# Patient Record
Sex: MALE | Race: WHITE | Employment: OTHER | URBAN - METROPOLITAN AREA
[De-identification: names, ages, dates, MRNs, and addresses within clinical notes are randomized per-mention and may not be internally consistent; named-entity substitution may affect disease eponyms.]

---

## 2017-10-30 ENCOUNTER — RECORDS - HEALTHEAST (OUTPATIENT)
Dept: LAB | Facility: CLINIC | Age: 62
End: 2017-10-30

## 2017-10-30 LAB
CHOLEST SERPL-MCNC: 215 MG/DL
FASTING STATUS PATIENT QL REPORTED: ABNORMAL
HDLC SERPL-MCNC: 54 MG/DL
LDLC SERPL CALC-MCNC: 135 MG/DL
PSA SERPL-MCNC: 1.4 NG/ML (ref 0–4.5)
TRIGL SERPL-MCNC: 128 MG/DL

## 2017-10-31 LAB
ANA SER QL: 8.1 U
HCV AB SERPL QL IA: NEGATIVE

## 2017-11-07 LAB — DNA (DS) ANTIBODY - HISTORICAL: 3 IU

## 2018-02-01 ENCOUNTER — RECORDS - HEALTHEAST (OUTPATIENT)
Dept: GENERAL RADIOLOGY | Facility: CLINIC | Age: 63
End: 2018-02-01

## 2018-02-01 ENCOUNTER — OFFICE VISIT - HEALTHEAST (OUTPATIENT)
Dept: RHEUMATOLOGY | Facility: CLINIC | Age: 63
End: 2018-02-01

## 2018-02-01 DIAGNOSIS — M25.50 MULTIPLE JOINT PAIN: ICD-10-CM

## 2018-02-01 DIAGNOSIS — G89.29 CHRONIC BILATERAL LOW BACK PAIN WITHOUT SCIATICA: ICD-10-CM

## 2018-02-01 DIAGNOSIS — G89.29 OTHER CHRONIC PAIN: ICD-10-CM

## 2018-02-01 DIAGNOSIS — R29.898 WEAKNESS OF BOTH LEGS: ICD-10-CM

## 2018-02-01 DIAGNOSIS — M79.7 FIBROMYALGIA: ICD-10-CM

## 2018-02-01 DIAGNOSIS — R29.898 WEAKNESS OF BOTH ARMS: ICD-10-CM

## 2018-02-01 DIAGNOSIS — R20.2 PARESTHESIA OF BILATERAL LEGS: ICD-10-CM

## 2018-02-01 DIAGNOSIS — R76.8 ANA POSITIVE: ICD-10-CM

## 2018-02-01 DIAGNOSIS — M54.2 CHRONIC NECK PAIN: ICD-10-CM

## 2018-02-01 DIAGNOSIS — M54.50 LOW BACK PAIN: ICD-10-CM

## 2018-02-01 DIAGNOSIS — G89.29 CHRONIC NECK PAIN: ICD-10-CM

## 2018-02-01 DIAGNOSIS — M54.2 CERVICALGIA: ICD-10-CM

## 2018-02-01 DIAGNOSIS — M54.50 CHRONIC BILATERAL LOW BACK PAIN WITHOUT SCIATICA: ICD-10-CM

## 2018-02-01 DIAGNOSIS — M17.0 OSTEOARTHRITIS OF BOTH KNEES, UNSPECIFIED OSTEOARTHRITIS TYPE: ICD-10-CM

## 2018-02-01 LAB
ALBUMIN UR-MCNC: NEGATIVE MG/DL
APPEARANCE UR: CLEAR
BILIRUB UR QL STRIP: NEGATIVE
COLOR UR AUTO: YELLOW
CREAT UR-MCNC: 148.1 MG/DL
GLUCOSE UR STRIP-MCNC: NEGATIVE MG/DL
HGB UR QL STRIP: NEGATIVE
KETONES UR STRIP-MCNC: NEGATIVE MG/DL
LEUKOCYTE ESTERASE UR QL STRIP: NEGATIVE
MICROALBUMIN UR-MCNC: <0.5 MG/DL (ref 0–1.99)
MICROALBUMIN/CREAT UR: NORMAL MG/G
NITRATE UR QL: NEGATIVE
PH UR STRIP: 5.5 [PH] (ref 4.5–8)
SP GR UR STRIP: 1.02 (ref 1–1.03)
UROBILINOGEN UR STRIP-ACNC: NORMAL

## 2018-02-01 ASSESSMENT — MIFFLIN-ST. JEOR: SCORE: 1751.75

## 2018-02-02 LAB
ALBUMIN SERPL-MCNC: 4.2 G/DL (ref 3.5–5)
C REACTIVE PROTEIN LHE: 0.5 MG/DL (ref 0–0.8)
CALCIUM SERPL-MCNC: 9.6 MG/DL (ref 8.5–10.5)
MAGNESIUM SERPL-MCNC: 2.4 MG/DL (ref 1.8–2.6)
URATE SERPL-MCNC: 6.7 MG/DL (ref 3–8)

## 2018-02-04 LAB — ACE SERPL-CCNC: 22 U/L (ref 9–67)

## 2018-02-05 LAB
25(OH)D3 SERPL-MCNC: 22.3 NG/ML (ref 30–80)
CARDIOLIPIN IGA SER IA-ACNC: <0.5 APL U/ML (ref 0–19.9)
CARDIOLIPIN IGG SER IA-ACNC: <1.6 GPL-U/ML (ref 0–19.9)
CARDIOLIPIN IGM SER IA-ACNC: 0.8 MPL-U/ML (ref 0–19.9)
HLA-B27 RESULT - HISTORICAL: NEGATIVE
INTERPRETATION: NORMAL

## 2018-02-07 ENCOUNTER — COMMUNICATION - HEALTHEAST (OUTPATIENT)
Dept: RHEUMATOLOGY | Facility: CLINIC | Age: 63
End: 2018-02-07

## 2018-02-07 ENCOUNTER — AMBULATORY - HEALTHEAST (OUTPATIENT)
Dept: PHYSICAL MEDICINE AND REHAB | Facility: CLINIC | Age: 63
End: 2018-02-07

## 2018-02-07 DIAGNOSIS — R20.2 PARESTHESIA OF BILATERAL LEGS: ICD-10-CM

## 2018-02-07 DIAGNOSIS — E55.9 VITAMIN D DEFICIENCY: ICD-10-CM

## 2018-02-07 DIAGNOSIS — G89.29 CHRONIC BILATERAL LOW BACK PAIN, WITH SCIATICA PRESENCE UNSPECIFIED: ICD-10-CM

## 2018-02-07 DIAGNOSIS — M54.50 LUMBAR BACK PAIN: ICD-10-CM

## 2018-02-07 DIAGNOSIS — M54.5 CHRONIC BILATERAL LOW BACK PAIN, WITH SCIATICA PRESENCE UNSPECIFIED: ICD-10-CM

## 2018-02-07 LAB — DRVVT, LUPUS ANTICOAGULANT - HISTORICAL: 42 SEC

## 2018-02-12 ENCOUNTER — HOSPITAL ENCOUNTER (OUTPATIENT)
Dept: PHYSICAL MEDICINE AND REHAB | Facility: CLINIC | Age: 63
Discharge: HOME | End: 2018-02-12
Attending: PHYSICAL MEDICINE & REHABILITATION

## 2018-02-12 ENCOUNTER — COMMUNICATION - HEALTHEAST (OUTPATIENT)
Dept: RHEUMATOLOGY | Facility: CLINIC | Age: 63
End: 2018-02-12

## 2018-02-12 DIAGNOSIS — M43.16 SPONDYLOLISTHESIS, LUMBAR REGION: ICD-10-CM

## 2018-02-12 DIAGNOSIS — M25.50 ARTHRALGIA: ICD-10-CM

## 2018-02-12 DIAGNOSIS — M54.2 CERVICALGIA: ICD-10-CM

## 2018-02-12 DIAGNOSIS — M79.10 MYALGIA: ICD-10-CM

## 2018-02-12 DIAGNOSIS — R41.3 MEMORY CHANGE: ICD-10-CM

## 2018-02-12 ASSESSMENT — MIFFLIN-ST. JEOR: SCORE: 1751.75

## 2018-02-16 ENCOUNTER — HOSPITAL ENCOUNTER (OUTPATIENT)
Dept: PHYSICAL MEDICINE AND REHAB | Facility: CLINIC | Age: 63
Discharge: HOME | End: 2018-02-16
Attending: INTERNAL MEDICINE

## 2018-02-16 DIAGNOSIS — G89.29 CHRONIC BILATERAL LOW BACK PAIN WITHOUT SCIATICA: ICD-10-CM

## 2018-02-16 DIAGNOSIS — R29.898 WEAKNESS OF BOTH ARMS: ICD-10-CM

## 2018-02-16 DIAGNOSIS — G89.29 CHRONIC NECK PAIN: ICD-10-CM

## 2018-02-16 DIAGNOSIS — M54.2 CHRONIC NECK PAIN: ICD-10-CM

## 2018-02-16 DIAGNOSIS — R29.898 WEAKNESS OF BOTH LEGS: ICD-10-CM

## 2018-02-16 DIAGNOSIS — M54.50 CHRONIC BILATERAL LOW BACK PAIN WITHOUT SCIATICA: ICD-10-CM

## 2018-02-19 ENCOUNTER — COMMUNICATION - HEALTHEAST (OUTPATIENT)
Dept: RHEUMATOLOGY | Facility: CLINIC | Age: 63
End: 2018-02-19

## 2018-02-20 ENCOUNTER — COMMUNICATION - HEALTHEAST (OUTPATIENT)
Dept: RHEUMATOLOGY | Facility: CLINIC | Age: 63
End: 2018-02-20

## 2018-03-12 ENCOUNTER — HOSPITAL ENCOUNTER (OUTPATIENT)
Dept: MRI IMAGING | Facility: CLINIC | Age: 63
Discharge: HOME | End: 2018-03-12
Attending: PHYSICAL MEDICINE & REHABILITATION

## 2018-03-12 DIAGNOSIS — R41.3 MEMORY CHANGE: ICD-10-CM

## 2018-03-12 DIAGNOSIS — M79.10 MYALGIA: ICD-10-CM

## 2018-03-12 DIAGNOSIS — M54.2 CERVICALGIA: ICD-10-CM

## 2018-03-13 ENCOUNTER — COMMUNICATION - HEALTHEAST (OUTPATIENT)
Dept: PHYSICAL MEDICINE AND REHAB | Facility: CLINIC | Age: 63
End: 2018-03-13

## 2018-03-14 ENCOUNTER — OFFICE VISIT - HEALTHEAST (OUTPATIENT)
Dept: RHEUMATOLOGY | Facility: CLINIC | Age: 63
End: 2018-03-14

## 2018-03-14 DIAGNOSIS — E55.9 VITAMIN D DEFICIENCY: ICD-10-CM

## 2018-03-14 DIAGNOSIS — R76.8 ANA POSITIVE: ICD-10-CM

## 2018-03-14 DIAGNOSIS — M54.50 CHRONIC BILATERAL LOW BACK PAIN WITHOUT SCIATICA: ICD-10-CM

## 2018-03-14 DIAGNOSIS — M17.0 OSTEOARTHRITIS OF BOTH KNEES, UNSPECIFIED OSTEOARTHRITIS TYPE: ICD-10-CM

## 2018-03-14 DIAGNOSIS — R20.2 PARESTHESIA OF BILATERAL LEGS: ICD-10-CM

## 2018-03-14 DIAGNOSIS — M79.671 PAIN IN BOTH FEET: ICD-10-CM

## 2018-03-14 DIAGNOSIS — G89.29 CHRONIC BILATERAL LOW BACK PAIN WITHOUT SCIATICA: ICD-10-CM

## 2018-03-14 DIAGNOSIS — M79.7 FIBROMYALGIA: ICD-10-CM

## 2018-03-14 DIAGNOSIS — M21.70 LEG LENGTH DISCREPANCY: ICD-10-CM

## 2018-03-14 DIAGNOSIS — M79.672 PAIN IN BOTH FEET: ICD-10-CM

## 2018-03-14 ASSESSMENT — MIFFLIN-ST. JEOR: SCORE: 1754.47

## 2018-03-15 ENCOUNTER — HOSPITAL ENCOUNTER (OUTPATIENT)
Dept: PHYSICAL MEDICINE AND REHAB | Facility: CLINIC | Age: 63
Discharge: HOME | End: 2018-03-15
Attending: PHYSICAL MEDICINE & REHABILITATION

## 2018-03-15 DIAGNOSIS — G89.29 CHRONIC NECK PAIN: ICD-10-CM

## 2018-03-15 DIAGNOSIS — M54.2 CHRONIC NECK PAIN: ICD-10-CM

## 2018-03-15 DIAGNOSIS — M25.50 ARTHRALGIA: ICD-10-CM

## 2018-03-15 DIAGNOSIS — M79.10 MYALGIA: ICD-10-CM

## 2018-03-15 DIAGNOSIS — R41.3 MEMORY CHANGE: ICD-10-CM

## 2018-03-15 DIAGNOSIS — M43.16 SPONDYLOLISTHESIS, LUMBAR REGION: ICD-10-CM

## 2018-03-15 ASSESSMENT — MIFFLIN-ST. JEOR: SCORE: 1742.67

## 2018-04-11 ENCOUNTER — COMMUNICATION - HEALTHEAST (OUTPATIENT)
Dept: ADMINISTRATIVE | Facility: CLINIC | Age: 63
End: 2018-04-11

## 2018-05-04 ENCOUNTER — COMMUNICATION - HEALTHEAST (OUTPATIENT)
Dept: SCHEDULING | Facility: CLINIC | Age: 63
End: 2018-05-04

## 2018-05-04 DIAGNOSIS — E55.9 VITAMIN D DEFICIENCY: ICD-10-CM

## 2018-07-18 ENCOUNTER — OFFICE VISIT - HEALTHEAST (OUTPATIENT)
Dept: RHEUMATOLOGY | Facility: CLINIC | Age: 63
End: 2018-07-18

## 2018-07-18 DIAGNOSIS — M79.671 PAIN IN BOTH FEET: ICD-10-CM

## 2018-07-18 DIAGNOSIS — R76.8 ANA POSITIVE: ICD-10-CM

## 2018-07-18 DIAGNOSIS — E55.9 VITAMIN D DEFICIENCY: ICD-10-CM

## 2018-07-18 DIAGNOSIS — M54.5 CHRONIC BILATERAL LOW BACK PAIN, WITH SCIATICA PRESENCE UNSPECIFIED: ICD-10-CM

## 2018-07-18 DIAGNOSIS — G89.29 CHRONIC BILATERAL LOW BACK PAIN, WITH SCIATICA PRESENCE UNSPECIFIED: ICD-10-CM

## 2018-07-18 DIAGNOSIS — M79.7 FIBROMYALGIA: ICD-10-CM

## 2018-07-18 DIAGNOSIS — M25.50 MULTIPLE JOINT PAIN: ICD-10-CM

## 2018-07-18 DIAGNOSIS — M17.0 OSTEOARTHRITIS OF BOTH KNEES, UNSPECIFIED OSTEOARTHRITIS TYPE: ICD-10-CM

## 2018-07-18 DIAGNOSIS — M79.672 PAIN IN BOTH FEET: ICD-10-CM

## 2018-07-18 ASSESSMENT — MIFFLIN-ST. JEOR: SCORE: 1742.67

## 2018-08-30 ENCOUNTER — COMMUNICATION - HEALTHEAST (OUTPATIENT)
Dept: LAB | Facility: CLINIC | Age: 63
End: 2018-08-30

## 2018-08-30 DIAGNOSIS — M15.9 OSTEOARTHRITIS OF MULTIPLE JOINTS, UNSPECIFIED OSTEOARTHRITIS TYPE: ICD-10-CM

## 2018-08-30 DIAGNOSIS — E55.9 VITAMIN D DEFICIENCY: ICD-10-CM

## 2018-09-05 ENCOUNTER — AMBULATORY - HEALTHEAST (OUTPATIENT)
Dept: LAB | Facility: CLINIC | Age: 63
End: 2018-09-05

## 2018-09-05 DIAGNOSIS — E55.9 VITAMIN D DEFICIENCY: ICD-10-CM

## 2018-09-05 DIAGNOSIS — M15.9 OSTEOARTHRITIS OF MULTIPLE JOINTS, UNSPECIFIED OSTEOARTHRITIS TYPE: ICD-10-CM

## 2018-09-05 LAB
ALBUMIN SERPL-MCNC: 4.3 G/DL (ref 3.5–5)
ALT SERPL W P-5'-P-CCNC: 34 U/L (ref 0–45)
AST SERPL W P-5'-P-CCNC: 25 U/L (ref 0–40)
CALCIUM SERPL-MCNC: 9.8 MG/DL (ref 8.5–10.5)
CREAT SERPL-MCNC: 0.98 MG/DL (ref 0.7–1.3)
GFR SERPL CREATININE-BSD FRML MDRD: >60 ML/MIN/1.73M2

## 2018-09-06 LAB — 25(OH)D3 SERPL-MCNC: 28 NG/ML (ref 30–80)

## 2018-09-17 ENCOUNTER — COMMUNICATION - HEALTHEAST (OUTPATIENT)
Dept: RHEUMATOLOGY | Facility: CLINIC | Age: 63
End: 2018-09-17

## 2018-09-17 DIAGNOSIS — E55.9 VITAMIN D DEFICIENCY: ICD-10-CM

## 2018-09-25 ENCOUNTER — COMMUNICATION - HEALTHEAST (OUTPATIENT)
Dept: RHEUMATOLOGY | Facility: CLINIC | Age: 63
End: 2018-09-25

## 2018-10-24 ENCOUNTER — OFFICE VISIT - HEALTHEAST (OUTPATIENT)
Dept: RHEUMATOLOGY | Facility: CLINIC | Age: 63
End: 2018-10-24

## 2018-10-24 DIAGNOSIS — E55.9 VITAMIN D DEFICIENCY: ICD-10-CM

## 2018-10-24 DIAGNOSIS — M17.0 OSTEOARTHRITIS OF BOTH KNEES, UNSPECIFIED OSTEOARTHRITIS TYPE: ICD-10-CM

## 2018-10-24 DIAGNOSIS — M25.50 MULTIPLE JOINT PAIN: ICD-10-CM

## 2018-10-24 DIAGNOSIS — R76.8 ANA POSITIVE: ICD-10-CM

## 2018-10-24 DIAGNOSIS — M79.7 FIBROMYALGIA: ICD-10-CM

## 2018-11-16 ENCOUNTER — COMMUNICATION - HEALTHEAST (OUTPATIENT)
Dept: ADMINISTRATIVE | Facility: CLINIC | Age: 63
End: 2018-11-16

## 2018-12-03 ENCOUNTER — COMMUNICATION - HEALTHEAST (OUTPATIENT)
Dept: RHEUMATOLOGY | Facility: CLINIC | Age: 63
End: 2018-12-03

## 2018-12-10 ENCOUNTER — COMMUNICATION - HEALTHEAST (OUTPATIENT)
Dept: RHEUMATOLOGY | Facility: CLINIC | Age: 63
End: 2018-12-10

## 2019-04-03 ENCOUNTER — COMMUNICATION - HEALTHEAST (OUTPATIENT)
Dept: RHEUMATOLOGY | Facility: CLINIC | Age: 64
End: 2019-04-03

## 2019-04-23 ENCOUNTER — COMMUNICATION - HEALTHEAST (OUTPATIENT)
Dept: ADMINISTRATIVE | Facility: CLINIC | Age: 64
End: 2019-04-23

## 2019-05-09 ENCOUNTER — COMMUNICATION - HEALTHEAST (OUTPATIENT)
Dept: RHEUMATOLOGY | Facility: CLINIC | Age: 64
End: 2019-05-09

## 2019-05-09 DIAGNOSIS — M79.7 FIBROMYALGIA: ICD-10-CM

## 2020-09-17 ENCOUNTER — VIRTUAL VISIT (OUTPATIENT)
Dept: SLEEP MEDICINE | Facility: CLINIC | Age: 65
End: 2020-09-17
Payer: COMMERCIAL

## 2020-09-17 VITALS — HEIGHT: 68 IN | WEIGHT: 220 LBS | BODY MASS INDEX: 33.34 KG/M2

## 2020-09-17 DIAGNOSIS — R06.83 SNORING: Primary | ICD-10-CM

## 2020-09-17 DIAGNOSIS — G47.30 OBSERVED SLEEP APNEA: ICD-10-CM

## 2020-09-17 DIAGNOSIS — R40.0 SLEEPINESS: ICD-10-CM

## 2020-09-17 PROBLEM — L30.9 CHRONIC ECZEMA: Status: ACTIVE | Noted: 2018-12-05

## 2020-09-17 PROBLEM — M79.7 FIBROMYALGIA: Status: ACTIVE | Noted: 2018-12-05

## 2020-09-17 PROCEDURE — 99203 OFFICE O/P NEW LOW 30 MIN: CPT | Mod: TEL | Performed by: PHYSICIAN ASSISTANT

## 2020-09-17 RX ORDER — ESCITALOPRAM OXALATE 20 MG/1
TABLET ORAL
COMMUNITY
Start: 2020-09-01

## 2020-09-17 ASSESSMENT — MIFFLIN-ST. JEOR: SCORE: 1762.41

## 2020-09-17 NOTE — PROGRESS NOTES
"Quentin Bahena is a 65 year old male who is being evaluated via a billable telephone visit.      The patient has been notified of following:     \"This telephone visit will be conducted via a call between you and your physician/provider. We have found that certain health care needs can be provided without the need for a physical exam.  This service lets us provide the care you need with a short phone conversation.  If a prescription is necessary we can send it directly to your pharmacy.  If lab work is needed we can place an order for that and you can then stop by our lab to have the test done at a later time.    Telephone visits are billed at different rates depending on your insurance coverage. During this emergency period, for some insurers they may be billed the same as an in-person visit.  Please reach out to your insurance provider with any questions.    If during the course of the call the physician/provider feels a telephone visit is not appropriate, you will not be charged for this service.\"    Patient has given verbal consent for Telephone visit?  Yes    What phone number would you like to be contacted at? [phone]    How would you like to obtain your AVS? Mail a copy        "

## 2020-09-17 NOTE — PROGRESS NOTES
Sleep Consultation:    Date on this visit: 9/17/2020    Quentin Bahena is sent by No ref. provider found for a sleep consultation regarding suspected apnea.    Primary Physician: No Ref-Primary, Physician     Quentin Bahena reports sleepiness, snoring for a couple of years. His medical history is significant for reflux, depression/anxiety and fibromyalgia.     Quentin goes to sleep at 11:00 PM during the week. He wakes up at 8:00 AM without an alarm. He sometimes sleeps as late as 10 AM (in and out of sleep at the end of the night). He used to wake spontaneously around 6:30-7 AM when working. He falls asleep in 30 minutes.  Quentin has difficulty falling asleep. He takes an over the counter sleeping pill. He wakes up 4 times a night for 5-10 minutes before falling back to sleep.  Quentin wakes up to go to the bathroom, uncertain reasons and to get a drink of water.  On weekends, his sleep schedule is the same.  Patient gets an average of 8 hours of sleep per night. He does like to lay in bed in bed to help relieve his aches and pains. He had been on cyclobenzaprine until recently. He was concerned it was making him tired.    Patient does not use electronics in bed, watch TV in bed and read in bed. He does sometimes have a racing mind at night (finances).    Quentin is retired as of the beginning of the year (in part due to COVID). He was a . He had only been working part time and was on SSDI for a couple of years due to pain. He lives with his wife.    Quentin does snore every night and snoring is loud. Patient does have a regular bed partner. They frequently sleep separately due to his snoring. There is report of gasping and snorting.  He does have witnessed apneas. He had wisdom teeth pulled in March and was observed to have 4 apnea events. His wife sometimes notices pauses in breathing for the last couple of years as well. He sometimes wakes himself with a snort if he dozes in his chair.  Patient sleeps on his  side. He can't sleep on his back. It is uncomfortable. He has occasional morning dry mouth, denies morning headaches, morning confusion and restless legs. Quentin denies any bruxism, sleep walking, sleep talking, dream enactment, sleep paralysis, cataplexy and hypnogogic/hypnopompic hallucinations.    Quentin has difficulty breathing through his nose, heartburn (treated with omeprazole), depression and anxiety, denies claustrophobia and reflux at night.  He takes omeprazole in the morning for reflux. He recently had a septoplasty, but that did not improve his nasal airflow or sleep.    Quentin denies any significant weight change in the last few years. His weight is up almost 15 pounds in the last 5 years. Patient describes themself as a morning person (used to be more of a morning person).  He would prefer to go to sleep at 11:00 PM and wake up at 8:00 AM.  Patient's [place] Sleepiness score 7/24 inconsistent with daytime sleepiness.      Quentin naps 2 times per week for 120 minutes, feels somewhat refreshed after naps. He takes some inadvertant naps (watching TV).  He denies dozing while driving. Patient was counseled on the importance of driving while alert, to pull over if drowsy, or nap before getting into the vehicle if sleepy.  He uses 2 cups/day of coffee. Last caffeine intake is usually before noon.    Allergies:    No Known Allergies    Medications:    Current Outpatient Medications   Medication Sig Dispense Refill     Cholecalciferol (VITAMIN D3) 25 MCG (1000 UT) CAPS Take 1,000 Units by mouth       escitalopram (LEXAPRO) 20 MG tablet TAKE 1 TABLET BY MOUTH EVERY DAY IN THE MORNING       omeprazole (PRILOSEC) 20 MG DR capsule Take 20 mg by mouth daily         Problem List:  Patient Active Problem List    Diagnosis Date Noted     Chronic eczema 12/05/2018     Priority: Medium     Fibromyalgia 12/05/2018     Priority: Medium     Knee osteoarthritis 04/16/2015     Priority: Medium     MITRA positive 03/26/2015      Priority: Medium     Psoriasis 2009     Priority: Medium        Past Medical/Surgical History:  Past Medical History:   Diagnosis Date     Depressive disorder      Fibromyalgia      Past Surgical History:   Procedure Laterality Date     ENT SURGERY  2020    septoplasty     RELEASE TRIGGER FINGER       wisdom teeth         Social History:  Social History     Socioeconomic History     Marital status:      Spouse name: Not on file     Number of children: Not on file     Years of education: Not on file     Highest education level: Not on file   Occupational History     Not on file   Social Needs     Financial resource strain: Not on file     Food insecurity     Worry: Not on file     Inability: Not on file     Transportation needs     Medical: Not on file     Non-medical: Not on file   Tobacco Use     Smoking status: Former Smoker     Packs/day: 1.00     Years: 15.00     Pack years: 15.00     Last attempt to quit: 2000     Years since quittin.7     Smokeless tobacco: Never Used     Tobacco comment: smoked off and on   Substance and Sexual Activity     Alcohol use: Yes     Comment: 3-4 days per week, 2 drinks, unless golfing, then 6     Drug use: Not Currently     Sexual activity: Not on file   Lifestyle     Physical activity     Days per week: Not on file     Minutes per session: Not on file     Stress: Not on file   Relationships     Social connections     Talks on phone: Not on file     Gets together: Not on file     Attends Caodaism service: Not on file     Active member of club or organization: Not on file     Attends meetings of clubs or organizations: Not on file     Relationship status: Not on file     Intimate partner violence     Fear of current or ex partner: Not on file     Emotionally abused: Not on file     Physically abused: Not on file     Forced sexual activity: Not on file   Other Topics Concern     Parent/sibling w/ CABG, MI or angioplasty before 65F 55M? Not Asked   Social  "History Narrative     Not on file       Family History:  Family History   Problem Relation Age of Onset     Throat cancer Father      Sleep Apnea Son      Diabetes No family hx of        Review of Systems:  A complete review of systems reviewed by me is negative with the exeption of what has been mentioned in the history of present illness.  CONSTITUTIONAL: NEGATIVE for weight gain/loss, fever, chills, sweats or night sweats, drug allergies.  EYES: NEGATIVE for changes in vision, blind spots, double vision.  ENT: NEGATIVE for ear pain, sore throat, sinus pain, post-nasal drip, runny nose, bloody nose  CARDIAC: NEGATIVE for fast heartbeats or fluttering in chest, chest pain or pressure, breathlessness when lying flat, swollen legs or swollen feet.  NEUROLOGIC: NEGATIVE headaches, weakness or numbness in the arms or legs.  DERMATOLOGIC: NEGATIVE for rashes, new moles or change in mole(s)  PULMONARY: NEGATIVE SOB at rest, SOB with activity, dry cough, productive cough, coughing up blood, wheezing or whistling when breathing.    GASTROINTESTINAL: NEGATIVE for nausea or vomitting, loose or watery stools, fat or grease in stools, constipation, abdominal pain, bowel movements black in color or blood noted.  GENITOURINARY: NEGATIVE for pain during urination, blood in urine, urinating more frequently than usual, irregular menstrual periods.  MUSCULOSKELETAL: NEGATIVE for muscle pain, bone pain, swollen joints.  MUSCULOSKELETAL:  POSITIVE for  Joint pains  ENDOCRINE: NEGATIVE for increased thirst or urination, diabetes.  LYMPHATIC: NEGATIVE for swollen lymph nodes, lumps or bumps in the breasts or nipple discharge.    Physical Examination:  Vitals: Ht 1.727 m (5' 8\")   Wt 99.8 kg (220 lb)   BMI 33.45 kg/m           [place] Total Score 9/17/2020   Total score - [place] 7       GOPI Total Score: 22 (09/17/20 0800)      Impression/Plan:    (R06.83) Snoring  (primary encounter diagnosis), (R40.0) Sleepiness, (G47.30) Observed " sleep apnea  Comment: Quentin presents with concerns of loud snoring, daytime sleepiness and observed apnea over the last couple of years. He has been sleeping in a separate room from his wife because of his snoring. She has observed pauses in breathing. He was also observed to have pauses in breathing when having his wisdom teeth pulled this last year. He has discontinued a number of his medications for pain thinking they may have been contributing to daytime sleepiness. His ESS is normal at 7/24, but he will nap 2 hours a couple times per week and doze inadvertently watching TV even after sleeping 8+ hours at night. STOP BANG TOTAL: 5 snoring, tired, observed apnea, age >50 (65), male gender. His son has FIDEL. His BMI is just subthreshold at 33. We do not have a neck circumference available. He does not have HTN. If he does not have apnea, depression and sleep disruption from pain and excessive sleep opportunity may be contributing to low energy.  Plan: HST-Home Sleep Apnea Test            Literature provided regarding sleep apnea.      He will follow up with me in approximately two weeks after his sleep study has been competed to review the results and discuss plan of care.       Polysomnography reviewed.  Obstructive sleep apnea reviewed.  Complications of untreated sleep apnea were reviewed.  39 minutes (8:30 AM to 9:09 AM) was spent during this visit, over 50% in counseling and coordination of care.   Bennett Goltz, PA-C     CC: No ref. provider found

## 2020-09-17 NOTE — PATIENT INSTRUCTIONS
"MY TREATMENT INFORMATION FOR SLEEP APNEA-  Quentin Bahena    DOCTOR : Bennett Ezra Goltz, PA-C  SLEEP CENTER : [place]     MY CONTACT NUMBER: [phone]    Am I having a sleep study at a sleep center?  Make sure you have an appointment for the study before you leave!    Am I having a home sleep study?  Watch this video:  /drop off device-   [email or web address]/[email or web address]?v=[email or web address]  Disposable device sent out require phone/computer application-   [email or web address]/watch?v=BCce_vbiwxE  Please verify your insurance coverage with your insurance carrier    Frequently asked questions:  1. What is Obstructive Sleep Apnea (FIDEL)? FIDEL is the most common type of sleep apnea. Apnea means, \"without breath.\"  Apnea is most often caused by narrowing or collapse of the upper airway as muscles relax during sleep.   Almost everyone has occasional apneas. Most people with sleep apnea have had brief interruptions at night frequently for many years.  The severity of sleep apnea is related to how frequent and severe the events are.   2. What are the consequences of FIDEL? Symptoms include: feeling sleepy during the day, snoring loudly, gasping or stopping of breathing, trouble sleeping, and occasionally morning headaches or heartburn at night.  Sleepiness can be serious and even increase the risk of falling asleep while driving. Other health consequences may include development of high blood pressure and other cardiovascular disease in persons who are susceptible. Untreated FIDEL  can contribute to heart disease, stroke and diabetes.   3. What are the treatment options? In most situations, sleep apnea is a lifelong disease that must be managed with daily therapy. Medications are not effective for sleep apnea and surgery is generally not considered until other therapies have been tried. Your treatment is your choice. Continuous Positive Airway (CPAP) works right away and is the therapy that is effective in nearly everyone. " An oral device to hold your jaw forward is usually the next most reliable option. Other options include postioning devices (to keep you off your back), weight loss, and surgery including a tongue pacing device. There is more detail about some of these options below.    Important tips for using CPAP and similar devices   Know your equipment:  CPAP is continuous positive airway pressure that prevents obstructive sleep apnea by keeping the throat from collapsing while you are sleeping. In most cases, the device is  smart  and can slowly self-adjusts if your throat collapses and keeps a record every day of how well you are treated-this information is available to you and your care team.  BPAP is bilevel positive airway pressure that keeps your throat open and also assists each breath with a pressure boost to maintain adequate breathing.  Special kinds of BPAP are used in patients who have inadequate breathing from lung or heart disease. In most cases, the device is  smart  and can slowly self-adjusts to assist breathing. Like CPAP, the device keeps a record of how well you are treated.  Your mask is your connection to the device. You get to choose what feels most comfortable and the staff will help to make sure if fits. Here: are some examples of the different masks that are available:       Key points to remember on your journey with sleep apnea:  1. Sleep study.  PAP devices often need to be adjusted during a sleep study to show that they are effective and adjusted right.  2. Good tips to remember: Try wearing just the mask during a quiet time during the day so your body adapts to wearing it. A humidifier is recommended for comfort in most cases to prevent drying of your nose and throat. Allergy medication from your provider may help you if you are having nasal congestion.  3. Getting settled-in. It takes more than one night for most of us to get used to wearing a mask. Try wearing just the mask during a quiet time  during the day so your body adapts to wearing it. A humidifier is recommended for comfort in most cases. Our team will work with you carefully on the first day and will be in contact within 4 days and again at 2 and 4 weeks for advice and remote device adjustments. Your therapy is evaluated by the device each day.   4. Use it every night. The more you are able to sleep naturally for 7-8 hours, the more likely you will have good sleep and to prevent health risks or symptoms from sleep apnea. Even if you use it 4 hours it helps. Occasionally all of us are unable to use a medical therapy, in sleep apnea, it is not dangerous to miss one night.   5. Communicate. Call our skilled team on the number provided on the first day if your visit for problems that make it difficult to wear the device. Over 2 out of 3 patients can learn to wear the device long-term with help from our team. Remember to call our team or your sleep providers if you are unable to wear the device as we may have other solutions for those who cannot adapt to mask CPAP therapy. It is recommended that you sleep your sleep provider within the first 3 months and yearly after that if you are not having problems.   6. Use it for your health. We encourage use of CPAP masks during daytime quiet periods to allow your face and brain to adapt to the sensation of CPAP so that it will be a more natural sensation to awaken to at night or during naps. This can be very useful during the first few weeks or months of adapting to CPAP though it does not help medically to wear CPAP during wakefulness and  should not be used as a strategy just to meet guidelines.  7. Take care of your equipment. Make sure you clean your mask and tubing using directions every day and that your filter and mask are replaced as recommended or if they are not working.     BESIDES CPAP, WHAT OTHER THERAPIES ARE THERE?    Positioning Device  Positioning devices are generally used when sleep apnea is  mild and only occurs on your back.This example shows a pillow that straps around the waist. It may be appropriate for those whose sleep study shows milder sleep apnea that occurs primarily when lying flat on one's back. Preliminary studies have shown benefit but effectiveness at home may need to be verified by a home sleep test. These devices are generally not covered by medical insurance.  Examples of devices that maintain sleeping on the back to prevent snoring and mild sleep apnea.    Belt type body positioner  Http://HireArt.Postini/    Electronic reminder  Http://nightshifttherapy.com/  Http://www.Book Buyback.Postini.au/      Oral Appliance  What is oral appliance therapy?  An oral appliance device fits on your teeth at night like a retainer used after having braces. The device is made by a specialized dentist and requires several visits over 1-2 months before a manufactured device is made to fit your teeth and is adjusted to prevent your sleep apnea. Once an oral device is working properly, snoring should be improved. A home sleep test may be recommended at that time if to determine whether the sleep apnea is adequately treated.       Some things to remember:  -Oral devices are often, but not always, covered by your medical insurance. Be sure to check with your insurance provider.   -If you are referred for oral therapy, you will be given a list of specialized dentists to consider or you may choose to visit the Web site of the American Academy of Dental Sleep Medicine  -Oral devices are less likely to work if you have severe sleep apnea or are extremely overweight.     More detailed information  An oral appliance is a small acrylic device that fits over the upper and lower teeth  (similar to a retainer or a mouth guard). This device slightly moves jaw forward, which moves the base of the tongue forward, opens the airway, improves breathing for effective treat snoring and obstructive sleep apnea in perhaps 7 out of 10  people .  The best working devices are custom-made by a dental device  after a mold is made of the teeth 1, 2, 3.  When is an oral appliance indicated?  Oral appliance therapy is recommended as a first-line treatment for patients with primary snoring, mild sleep apnea, and for patients with moderate sleep apnea who prefer appliance therapy to use of CPAP4, 5. Severity of sleep apnea is determined by sleep testing and is based on the number of respiratory events per hour of sleep.   How successful is oral appliance therapy?  The success rate of oral appliance therapy in patients with mild sleep apnea is 75-80% while in patients with moderate sleep apnea it is 50-70%. The chance of success in patients with severe sleep apnea is 40-50%. The research also shows that oral appliances have a beneficial effect on the cardiovascular health of FIDEL patients at the same magnitude as CPAP therapy7.  Oral appliances should be a second-line treatment in cases of severe sleep apnea, but if not completely successful then a combination therapy utilizing CPAP plus oral appliance therapy may be effective. Oral appliances tend to be effective in a broad range of patients although studies show that the patients who have the highest success are females, younger patients, those with milder disease, and less severe obesity. 3, 6.   Finding a dentist that practices dental sleep medicine  Specific training is available through the American Academy of Dental Sleep Medicine for dentists interested in working in the field of sleep. To find a dentist who is educated in the field of sleep and the use of oral appliances, near you, visit the Web site of the American Academy of Dental Sleep Medicine.    References  1. Selena et al. Objectively measured vs self-reported compliance during oral appliance therapy for sleep-disordered breathing. Chest 2013; 144(5): 6814-0991.  2. Linda et al. Objective measurement of compliance  during oral appliance therapy for sleep-disordered breathing. Thorax 2013; 68(1): 91-96.  3. Marianela, et al. Mandibular advancement devices in 620 men and women with FIDEL and snoring: tolerability and predictors of treatment success. Chest 2004; 125: 6210-4492.  4. Devonte et al. Oral appliances for snoring and FIDEL: a review. Sleep 2006; 29: 244-262.  5. Cristiano et al. Oral appliance treatment for FIDEL: an update. J Clin Sleep Med 2014; 10(2): 215-227.  6. Eros et al. Predictors of OSAH treatment outcome. J Dent Res 2007; 86: 8301-1765.    Weight Loss:    Weight loss is a long-term strategy that may improve sleep apnea in some patients.    Weight management is a personal decision and the decision should be based on your interest and the potential benefits.  If you are interested in exploring weight loss strategies, the following discussion covers the impact on weight loss on sleep apnea and the approaches that may be successful.    Being overweight does not necessarily mean you will have health consequences.  Those who have BMI over 35 or over 27 with existing medical conditions carries greater risk.   Weight loss decreases severity of sleep apnea in most people with obesity. For those with mild obesity who have developed snoring with weight gain, even 15-30 pound weight loss can improve and occasionally eliminate sleep apnea.  Structured and life-long dietary and health habits are necessary to lose weight and keep healthier weight levels.     Though there may be significant health benefits from weight loss, long-term weight loss is very difficult to achieve- studies show success with dietary management in less than 10% of people. In addition, substantial weight loss may require years of dietary control and may be difficult if patients have severe obesity. In these cases, surgical management may be considered.  Finally, older individuals who have tolerated obesity without health complications may be less  likely to benefit from weight loss strategies.      Your BMI is Body mass index is 33.45 kg/m .  Weight management is a personal decision.  If you are interested in exploring weight loss strategies, the following discussion covers the approaches that may be successful. Body mass index (BMI) is one way to tell whether you are at a healthy weight, overweight, or obese. It measures your weight in relation to your height.  A BMI of 18.5 to 24.9 is in the healthy range. A person with a BMI of 25 to 29.9 is considered overweight, and someone with a BMI of 30 or greater is considered obese. More than two-thirds of American adults are considered overweight or obese.  Being overweight or obese increases the risk for further weight gain. Excess weight may lead to heart disease and diabetes.  Creating and following plans for healthy eating and physical activity may help you improve your health.  Weight control is part of healthy lifestyle and includes exercise, emotional health, and healthy eating habits. Careful eating habits lifelong are the mainstay of weight control. Though there are significant health benefits from weight loss, long-term weight loss with diet alone may be very difficult to achieve- studies show long-term success with dietary management in less than 10% of people. Attaining a healthy weight may be especially difficult to achieve in those with severe obesity. In some cases, medications, devices and surgical management might be considered.  What can you do?  If you are overweight or obese and are interested in methods for weight loss, you should discuss this with your provider.     Consider reducing daily calorie intake by 500 calories.     Keep a food journal.     Avoiding skipping meals, consider cutting portions instead.    Diet combined with exercise helps maintain muscle while optimizing fat loss. Strength training is particularly important for building and maintaining muscle mass. Exercise helps reduce  stress, increase energy, and improves fitness. Increasing exercise without diet control, however, may not burn enough calories to loose weight.       Start walking three days a week 10-20 minutes at a time    Work towards walking thirty minutes five days a week     Eventually, increase the speed of your walking for 1-2 minutes at time    In addition, we recommend that you review healthy lifestyles and methods for weight loss available through the National Institutes of Health patient information sites:  http://win.niddk.nih.gov/publications/index.htm    And look into health and wellness programs that may be available through your health insurance provider, employer, local community center, or heidy club.    Weight management plan: Patient was referred to their PCP to discuss a diet and exercise plan.    Surgery:  Surgery for obstructive sleep apnea is considered generally only when other therapies fail to work. Surgery may be discussed with you if you are having a difficult time tolerating CPAP and or when there is an abnormal structure that requires surgical correction.  Nose and throat surgeries often enlarge the airway to prevent collapse.  Most of these surgeries create pain for 1-2 weeks and up to half of the most common surgeries are not effective throughout life.  You should carefully discuss the benefits and drawbacks to surgery with your sleep provider and surgeon to determine if it is the best solution for you.   More information  Surgery for FIDEL is directed at areas that are responsible for narrowing or complete obstruction of the airway during sleep.  There are a wide range of procedures available to enlarge and/or stabilize the airway to prevent blockage of breathing in the three major areas where it can occur: the palate, tongue, and nasal regions.  Successful surgical treatment depends on the accurate identification of the factors responsible for obstructive sleep apnea in each person.  A personalized  approach is required because there is no single treatment that works well for everyone.  Because of anatomic variation, consultation with an examination by a sleep surgeon is a critical first step in determining what surgical options are best for each patient.  In some cases, examination during sedation may be recommended in order to guide the selection of procedures.  Patients will be counseled about risks and benefits as well as the typical recovery course after surgery. Surgery is typically not a cure for a person s FIDEL.  However, surgery will often significantly improve one s FIDEL severity (termed  success rate ).  Even in the absence of a cure, surgery will decrease the cardiovascular risk associated with OSA7; improve overall quality of life8 (sleepiness, functionality, sleep quality, etc).  Palate Procedures:  Patients with FIDEL often have narrowing of their airway in the region of their tonsils and uvula.  The goals of palate procedures are to widen the airway in this region as well as to help the tissues resist collapse.  Modern palate procedure techniques focus on tissue conservation and soft tissue rearrangement, rather than tissue removal.  Often the uvula is preserved in this procedure. Residual sleep apnea is common in patient after pharyngoplasty with an average reduction in sleep apnea events of 33%2.    Tongue Procedures:  ExamWhile patients are awake, the muscles that surround the throat are active and keep this region open for breathing. These muscles relax during sleep, allowing the tongue and other structures to collapse and block breathing.  There are several different tongue procedures available.  Selection of a tongue base procedure depends on characteristics seen on physical exam.  Generally, procedures are aimed at removing bulky tissues in this area or preventing the back of the tongue from falling back during sleep.  Success rates for tongue surgery range from 50-62%3.  Hypoglossal Nerve  Stimulation:  Hypoglossal nerve stimulation has recently received approval from the [place] Food and Drug Administration for the treatment of obstructive sleep apnea.  This is based on research showing that the system was safe and effective in treating sleep apnea6.  Results showed that the median AHI score decreased 68%, from 29.3 to 9.0. This therapy uses an implant system that senses breathing patterns and delivers mild stimulation to airway muscles, which keeps the airway open during sleep.  The system consists of three fully implanted components: a small generator (similar in size to a pacemaker), a breathing sensor, and a stimulation lead.  Using a small handheld remote, a patient turns the therapy on before bed and off upon awakening.    Candidates for this device must be greater than 22 years of age, have moderate to severe FIDEL (AHI between 20-65), BMI less than 32, have tried CPAP/oral appliance without success, and have appropriate upper airway anatomy (determined by a sleep endoscopy performed by Dr. Villar).  Hypoglossal Nerve Stimulation Pathway:    The sleep surgeon s office will work with the patient through the insurance prior-authorization process (including communications and appeals).    Nasal Procedures:  Nasal obstruction can interfere with nasal breathing during the day and night.  Studies have shown that relief of nasal obstruction can improve the ability of some patients to tolerate positive airway pressure therapy for obstructive sleep apnea1.  Treatment options include medications such as nasal saline, topical corticosteroid and antihistamine sprays, and oral medications such as antihistamines or decongestants. Non-surgical treatments can include external nasal dilators for selected patients. If these are not successful by themselves, surgery can improve the nasal airway either alone or in combination with these other options.  Combination Procedures:  Combination of surgical procedures  and other treatments may be recommended, particularly if patients have more than one area of narrowing or persistent positional disease.  The success rate of combination surgery ranges from 66-80%2,3.    References  1. Dru JOHN. The Role of the Nose in Snoring and Obstructive Sleep Apnoea: An Update.  Eur Arch Otorhinolaryngol. 2011; 268: 1365-73.  2.  Latanya SM; Fabi JA; Nitin JR; Pallanch JF; Swapna MB; Raheem SG; Miranda ROSEN. Surgical modifications of the upper airway for obstructive sleep apnea in adults: a systematic review and meta-analysis. SLEEP 2010;33(10):1833-5255. Stephanie BELLA. Hypopharyngeal surgery in obstructive sleep apnea: an evidence-based medicine review.  Arch Otolaryngol Head Neck Surg. 2006 Feb;132(2):206-13.  3. Jaskaran YH1, Elizabeth Y, Joaquim YANA. The efficacy of anatomically based multilevel surgery for obstructive sleep apnea. Otolaryngol Head Neck Surg. 2003 Oct;129(4):327-35.  4. Stephanie BELLA, Goldberg A. Hypopharyngeal Surgery in Obstructive Sleep Apnea: An Evidence-Based Medicine Review. Arch Otolaryngol Head Neck Surg. 2006 Feb;132(2):206-13.  5. Eric BURGOS et al. Upper-Airway Stimulation for Obstructive Sleep Apnea.  N Engl J Med. 2014 Jan 9;370(2):139-49.  6. Eunice Y et al. Increased Incidence of Cardiovascular Disease in Middle-aged Men with Obstructive Sleep Apnea. Am J Respir Crit Care Med; 2002 166: 159-165  7. Malcom EM et al. Studying Life Effects and Effectiveness of Palatopharyngoplasty (SLEEP) study: Subjective Outcomes of Isolated Uvulopalatopharyngoplasty. Otolaryngol Head Neck Surg. 2011; 144: 623-631.

## 2020-10-26 ENCOUNTER — COMMUNICATION - HEALTHEAST (OUTPATIENT)
Dept: SLEEP MEDICINE | Facility: CLINIC | Age: 65
End: 2020-10-26

## 2020-10-29 ENCOUNTER — OFFICE VISIT (OUTPATIENT)
Dept: SLEEP MEDICINE | Facility: CLINIC | Age: 65
End: 2020-10-29
Payer: COMMERCIAL

## 2020-10-29 DIAGNOSIS — G47.30 OBSERVED SLEEP APNEA: ICD-10-CM

## 2020-10-29 DIAGNOSIS — R40.0 SLEEPINESS: ICD-10-CM

## 2020-10-29 DIAGNOSIS — R06.83 SNORING: ICD-10-CM

## 2020-10-30 ENCOUNTER — DOCUMENTATION ONLY (OUTPATIENT)
Dept: SLEEP MEDICINE | Facility: CLINIC | Age: 65
End: 2020-10-30
Payer: COMMERCIAL

## 2020-11-02 NOTE — PROGRESS NOTES
HST POST-STUDY QUESTIONNAIRE    1. What time did you go to bed?  11  2. How long do you think it took to fall asleep?  60 minutes  3. What time did you wake up to start the day?  730 am  4. Did you get up during the night at all?  yes  5. If you woke up, do you remember approximately what time(s)? Every hour until 430  6. Did you have any difficulty with the equipment?  No  7. Did you us any type of treatment with this study?  None  8. Was the head of the bed elevated? No  9. Did you sleep in a recliner?  No  10. Did you stop using CPAP at least 3 days before this test?  NA  11. Any other information you'd like us to know? Normally wake up every two hours and while I am awake I use the bathroom. Last night was a good night's sleep.

## 2020-12-07 ENCOUNTER — OFFICE VISIT (OUTPATIENT)
Dept: SLEEP MEDICINE | Facility: CLINIC | Age: 65
End: 2020-12-07
Payer: COMMERCIAL

## 2020-12-07 ENCOUNTER — DOCUMENTATION ONLY (OUTPATIENT)
Dept: SLEEP MEDICINE | Facility: CLINIC | Age: 65
End: 2020-12-07

## 2020-12-07 DIAGNOSIS — G47.33 OSA (OBSTRUCTIVE SLEEP APNEA): Primary | ICD-10-CM

## 2020-12-07 PROCEDURE — G0399 HOME SLEEP TEST/TYPE 3 PORTA: HCPCS | Performed by: INTERNAL MEDICINE

## 2020-12-08 ENCOUNTER — DOCUMENTATION ONLY (OUTPATIENT)
Dept: SLEEP MEDICINE | Facility: CLINIC | Age: 65
End: 2020-12-08

## 2020-12-08 ENCOUNTER — DOCUMENTATION ONLY (OUTPATIENT)
Dept: SLEEP MEDICINE | Facility: CLINIC | Age: 65
End: 2020-12-08
Payer: COMMERCIAL

## 2020-12-08 NOTE — PROGRESS NOTES
This HSAT was performed using a Noxturnal T3 device which recorded snore, sound, movement activity, body position, nasal pressure, oronasal thermal airflow, pulse, oximetry and both chest and abdominal respiratory effort. HSAT data was restricted to the time patient states they were in bed.     HSAT was scored using 1B 4% hypopnea rule.     HST AHI (Non-PAT): 29.9  Snoring was reported as moderate.  Time with SpO2 below 89% was 19.9 minutes.   Overall signal quality was good     Pt will follow up with sleep provider to determine appropriate therapy.

## 2020-12-15 NOTE — PROCEDURES
"HOME SLEEP STUDY INTERPRETATION    Patient: Quentin Bahena  MRN: [id number]  YOB: 1955  Study Date: 2020  Referring Provider: No Ref-Primary, Physician   Ordering Provider: Bennett Goltz, PA     Indications for Home Study: Quentin Bahena is a 65 year old male wit  medical history significant for reflux, depression/anxiety and fibromyalgia. He reports sleepiness and snoring for a couple of years.  Home sleep study was obtained to evaluate for possible obstructive sleep apnea.      Estimated body mass index is 33.45 kg/m  as calculated from the following:    Height as of 20: 1.727 m (5' 8\").    Weight as of 20: 99.8 kg (220 lb).  Total score - [place]: 7 (2020  8:00 AM)  STOP-BAN/8    Data: A full night home sleep study was performed recording the standard physiologic parameters including body position, movement, sound, nasal pressure, thermal oral airflow, chest and abdominal movements with respiratory inductance plethysmography, and oxygen saturation by pulse oximetry. Pulse rate was estimated by oximetry recording. This study was considered adequate based on > 4 hours of quality oximetry and respiratory recording. As specified by the AASM Manual for the Scoring of Sleep and Associated events, version 2.3, Rule VIII.D 1B, 4% oxygen desaturation scoring for hypopneas is used as a standard of care on all home sleep apnea testing.    Analysis Time: 452.8 minutes    Respiration:   Sleep Associated Hypoxemia: sustained hypoxemia was present. Baseline oxygen saturation was 92.7%.  Time with saturation less than or equal to 88% was 14.4 minutes. The lowest oxygen saturation was 75%.   Snoring: Snoring was present.  Respiratory events: The home study revealed a presence of 170 obstructive apneas and 5 mixed and central apneas. There were 51 hypopneas resulting in a combined apnea/hypopnea index [AHI] of 29.9 events per hour.  AHI was 64.5 per hour supine, n/a  prone, 11.2 per hour on " left side, and 28.4 per hour on right side.   Pattern: Excluding events noted above, respiratory rate and pattern was Normal.    Position: Percent of time spent: supine -12.9%, prone - 0%, on left -17.7%, on right -69.0%.    Heart Rate: By pulse oximetry normal rate was noted.     Assessment:   Severe obstructive sleep apnea, pronounced during supine sleep position.  Sleep associated hypoxemia was present.    Recommendations:  Treatment options for FIDEL include:a)  auto CPAP with pressure settings 5-15 cmH2O or b) combination of positional therapy during sleep along with oral appliance through referral to sleep dentistry  Suggest optimizing sleep hygiene and avoiding sleep deprivation  Weight management.    Diagnosis Code(s): Obstructive Sleep Apnea G47.33, Hypoxemia G47.36, Snoring R06.83    Ab Waite MD, December 14, 2020   Diplomate, American Board of Internal Medicine, Sleep Medicine

## 2020-12-17 NOTE — PROGRESS NOTES
"Quentin Bahena is a 65 year old male who is being evaluated via a billable telephone visit.      The patient has been notified of following:     \"This telephone visit will be conducted via a call between you and your physician/provider. We have found that certain health care needs can be provided without the need for a physical exam.  This service lets us provide the care you need with a short phone conversation.  If a prescription is necessary we can send it directly to your pharmacy.  If lab work is needed we can place an order for that and you can then stop by our lab to have the test done at a later time.    Telephone visits are billed at different rates depending on your insurance coverage. During this emergency period, for some insurers they may be billed the same as an in-person visit.  Please reach out to your insurance provider with any questions.    If during the course of the call the physician/provider feels a telephone visit is not appropriate, you will not be charged for this service.\"    Patient has given verbal consent for Telephone visit?  Yes    What phone number would you like to be contacted at? [phone]    How would you like to obtain your AVS? Mail a copy    Sleep Follow-Up Visit:    Date on this visit: 12/18/2020    Quentin Bahena comes in today for follow-up of his sleep study done on 12/7/2020. He was initially seen at the [facility] for sleepiness, snoring for a couple of years. His medical history is significant for reflux, depression/anxiety and fibromyalgia.        Analysis Time: 452.8 minutes  Wt: 220#   Respiration:   Sleep Associated Hypoxemia: sustained hypoxemia was present. Baseline oxygen saturation was 79.6%.  Time with saturation less than or equal to 88% was 14.4 minutes. The lowest oxygen saturation was 75%.   Snoring: Snoring was present.  Respiratory events: The home study revealed a presence of 170 obstructive apneas and 5 mixed and central apneas. " There were 51 hypopneas resulting in a combined apnea/hypopnea index [AHI] of 29.9 events per hour.  AHI was 64.5 per hour supine, n/a  prone, 11.2 per hour on left side, and 28.4 per hour on right side.   Pattern: Excluding events noted above, respiratory rate and pattern was Normal.     Position: Percent of time spent: supine -12.9%, prone - 0%, on left -17.7%, on right -69.0%.     Heart Rate: By pulse oximetry normal rate was noted.        Past medical/surgical history, family history, social history, medications and allergies were reviewed.      Problem List:  Patient Active Problem List    Diagnosis Date Noted     Chronic eczema 12/05/2018     Priority: Medium     Fibromyalgia 12/05/2018     Priority: Medium     Knee osteoarthritis 04/16/2015     Priority: Medium     MITRA positive 03/26/2015     Priority: Medium     Psoriasis 11/06/2009     Priority: Medium        Impression/Plan:    (G47.33) FIDEL (obstructive sleep apnea)  (primary encounter diagnosis)  Comment: moderate to severe FIDEL, AHI 29.9/hr. 64/hr when supine. O2 radha 75%. The preliminary results state the O2 baseline is 79% which is likely an error as he only spent 14 minutes below 89%.  Plan: Comprehensive DME         I am prescribing auto CPAP 5-15 cm, heated humidifier and compliance meter. The patient was informed of the mask exchange policy and the compliance goals. They were also informed that they would be followed by the sleep therapy management team during the first month of CPAP use.      He will follow up with me in about 2 month(s).     18 minutes (10:04 AM-10:22 AM) spent with patient, all of which were spent face-to-face counseling, consulting, coordinating plan of care.      Bennett Goltz, PA-C    CC: No ref. provider found

## 2020-12-18 ENCOUNTER — VIRTUAL VISIT (OUTPATIENT)
Dept: SLEEP MEDICINE | Facility: CLINIC | Age: 65
End: 2020-12-18
Payer: COMMERCIAL

## 2020-12-18 DIAGNOSIS — G47.33 OSA (OBSTRUCTIVE SLEEP APNEA): Primary | ICD-10-CM

## 2020-12-18 PROCEDURE — 99213 OFFICE O/P EST LOW 20 MIN: CPT | Mod: TEL | Performed by: PHYSICIAN ASSISTANT

## 2020-12-18 NOTE — PATIENT INSTRUCTIONS
You will be provided with an auto-titrating CPAP with a pressure range of 5-15 cm with heated humidity to limit nasal congestion. Adjust the heat level on humidifier to find a setting that prevents dry nose but does not cause condensation in the hose or mask. Use distilled water in the humidifier.  The CPAP has a ramp function that starts the pressure lower than your prescribed pressure and gradually increases it over a number of minutes.  This may make it easier to fall asleep.    Try to use the CPAP every-night, all night (minimum of 4 hours). Many insurances require that we prove you are using the CPAP at least 4 hours on at least 70% of nights over a 30 day period. We have 90 days to meet those criteria.            Discussed weight management and the impact of weight gain on sleep apnea.  Let me know if you snore or feel the pressure is too high.    You can get new supplies (mask, hose and filter) for your CPAP every 3-6 months, covered by insurance. You do not need to get supplies that often, but they are available if you would like them.  You may exchange the mask once within the first month if you feel the initial mask does not fit well.  Contact your medical equipment provider for equipment issues.  Please let me know if you have any return of snoring, daytime sleepiness or poor sleep quality. We will want to make sure your CPAP is adequately treating your apnea.  There is a website called CPAP.com that has accessories that may make CPAP use easier. Please visit it at your convenience.  Our phone number is [phone].    Follow-up 2 month.  Bring your CPAP machine with you to the follow up appointment.

## 2021-01-04 ENCOUNTER — DOCUMENTATION ONLY (OUTPATIENT)
Dept: SLEEP MEDICINE | Facility: CLINIC | Age: 66
End: 2021-01-04

## 2021-01-06 ENCOUNTER — DOCUMENTATION ONLY (OUTPATIENT)
Dept: SLEEP MEDICINE | Facility: CLINIC | Age: 66
End: 2021-01-06

## 2021-01-08 NOTE — PROGRESS NOTES
CALLED PT TO SEE IF HE IS WANTING TO RENT OR PURCHASE THE CPAP MACHINE. PT STATED THAT HE WILL CALL HIS INSURANCE COMPANY AND GET BACK TO ME REGARDING. WHEN SPEAKING WITH HIS INSURANCE COMPANY [facility] IS OUT OF NETWORK WITH [facility] [employer] AND THE PT WILL NEED TO USE ANOTHER DME PROVIDER WITHIN THERE NETWORK IF HE WOULD LIKE DME SERVICES COVERED. PT HAS BEEN MADE AWARE OF THIS FROM THE INSURANCE REP MARÍA ELENA REF# [id number].

## 2021-01-20 ENCOUNTER — TELEPHONE (OUTPATIENT)
Dept: SLEEP MEDICINE | Facility: CLINIC | Age: 66
End: 2021-01-20

## 2021-01-20 NOTE — TELEPHONE ENCOUNTER
Reason for call:  Other   Patient called regarding (reason for call): prescription    Additional comments:   Patient really needs his CPAP as soon as possible.  Patient's 1st machine failed. Was trying to complete this by end of 2020.  His CPAP prescription needs to be sent outside of  to:  fax [phone] [facility] (expires 1/31/2021) then beginning Feb 1 supplies go through [employer] [phone] per insurance guidelines    Phone number to reach patient:  Home number on file [phone] (home)    Best Time:  any    Can we leave a detailed message on this number?  YES    Travel screening: Not Applicable

## 2021-01-21 NOTE — TELEPHONE ENCOUNTER
Spoke with Quentin regarding third party [employer], or [employer].  Quentin will continue to have [employer] insurance, with third party care switching from [facility] on Feb 1, 2021 to [employer]. Informed him am faxing today.     [facility].  Faxed copy of facesheet with demographics/insurance, copy consultation notes 9/17/20, copy of HST with INTERP 12/7/2020, copy of f/u notes 12/18/2020, copy of autopap order 12/18/2020.  FAX:  [phone], ATTN: Cpap TEAM

## 2021-01-21 NOTE — TELEPHONE ENCOUNTER
Received faxed receipt of orders from [facility] , sent fax to HIM to be scanned into epic

## 2021-03-08 VITALS — HEIGHT: 68 IN | WEIGHT: 220 LBS | BODY MASS INDEX: 33.34 KG/M2

## 2021-03-08 ASSESSMENT — MIFFLIN-ST. JEOR: SCORE: 1762.41

## 2021-03-08 NOTE — PATIENT INSTRUCTIONS
Your BMI is Body mass index is 33.45 kg/m .  Weight management is a personal decision.  If you are interested in exploring weight loss strategies, the following discussion covers the approaches that may be successful. Body mass index (BMI) is one way to tell whether you are at a healthy weight, overweight, or obese. It measures your weight in relation to your height.  A BMI of 18.5 to 24.9 is in the healthy range. A person with a BMI of 25 to 29.9 is considered overweight, and someone with a BMI of 30 or greater is considered obese. More than two-thirds of American adults are considered overweight or obese.  Being overweight or obese increases the risk for further weight gain. Excess weight may lead to heart disease and diabetes.  Creating and following plans for healthy eating and physical activity may help you improve your health.  Weight control is part of healthy lifestyle and includes exercise, emotional health, and healthy eating habits. Careful eating habits lifelong are the mainstay of weight control. Though there are significant health benefits from weight loss, long-term weight loss with diet alone may be very difficult to achieve- studies show long-term success with dietary management in less than 10% of people. Attaining a healthy weight may be especially difficult to achieve in those with severe obesity. In some cases, medications, devices and surgical management might be considered.  What can you do?  If you are overweight or obese and are interested in methods for weight loss, you should discuss this with your provider.     Consider reducing daily calorie intake by 500 calories.     Keep a food journal.     Avoiding skipping meals, consider cutting portions instead.    Diet combined with exercise helps maintain muscle while optimizing fat loss. Strength training is particularly important for building and maintaining muscle mass. Exercise helps reduce stress, increase energy, and improves fitness.  Increasing exercise without diet control, however, may not burn enough calories to loose weight.       Start walking three days a week 10-20 minutes at a time    Work towards walking thirty minutes five days a week     Eventually, increase the speed of your walking for 1-2 minutes at time    In addition, we recommend that you review healthy lifestyles and methods for weight loss available through the National Institutes of Health patient information sites:  http://win.niddk.nih.gov/publications/index.htm    And look into health and wellness programs that may be available through your health insurance provider, employer, local community center, or heidy club.    Weight management plan: Patient was referred to their PCP to discuss a diet and exercise plan.

## 2021-03-08 NOTE — PROGRESS NOTES
Quentin is a 65 year old who is being evaluated via a billable telephone visit.      What phone number would you like to be contacted at? [phone]  How would you like to obtain your AVS? Mail a copy  Blanca Marcial CMA  CPAP Follow-Up Visit:    Date on this visit: 3/9/2021    Quentin Bahena has a follow-up visit today to review his CPAP use for FIDEL. He was initially seen at the [facility] for sleepiness, snoring for a couple of years. His medical history is significant for reflux, depression/anxiety and fibromyalgia.    Previous Study Results: HST  Date: 12/7/20.  Weight 220 pounds.  AHI: 29.9/hr. O2 radha 75%.    He does seem to be sleeping better. He no longer wakes with a sore throat.     PAP machine: ResMed. Pressure settings: 5-15 cm. He obtained his machine from [employer], but will have to change DME due to insurance.    The compliance data shows that the patient used the CPAP for 26/30 nights, 87% of nights for >4 hours.  The 95th% pressure is 11.2 cm.  The 95th% leak is 19.7 lpm.  The average nightly usage is 8:33.  The average AHI is 2.8/hr.       Interface:  Mask: N20 mask  Chin strap: No  Leak: No  Using Humidifier: Yes  Condensation in hose or mask: No     Difficulties with therapy:    [-] Snoring with CPAP:  [-] Difficulty tolerating the pressure:  [-] Epistaxis/dry nose:   [-] Nasal congestion:  [-] Dry mouth:  [-] Mouth breathing:   [-] Pain/skin breakdown:      Improvements noted with CPAP:   [+] waking up more refreshed  [+] sleeping better with less arousals  [+] nocturia improved   [+] improved energy level during the day    Weight change since sleep study: 220#    Bedtime: 11 PM.  Wake time: 7 AM. Falls asleep in 15 minutes. Wakes 2 times per night for 5 minutes.  Naps: would like to about 4-5 days per week for 120 minutes.  He does not always get the chance.     Past medical/surgical history, family history, social history, medications and allergies were reviewed.      Problem  List:  Patient Active Problem List    Diagnosis Date Noted     FIDEL (obstructive sleep apnea) 12/18/2020     Priority: Medium     Chronic eczema 12/05/2018     Priority: Medium     Fibromyalgia 12/05/2018     Priority: Medium     Knee osteoarthritis 04/16/2015     Priority: Medium     MITRA positive 03/26/2015     Priority: Medium     Psoriasis 11/06/2009     Priority: Medium        Impression/Plan:    (G47.33) FIDEL (obstructive sleep apnea)  (primary encounter diagnosis)  Comment: Quentin is doing well with CPAP, no concerns. He feels better and his usage is very regular.  Plan: Continue auto CPAP 5-15 cm. A prescription was written for new supplies. We reviewed recommendations for cleaning and replacing supplies.        He will follow up with me in about 1 year(s).     Phone call duration: 15 minutes. 8:30 AM-8:45 AM    Bennett Goltz, PA-C    CC: No ref. provider found

## 2021-03-09 ENCOUNTER — VIRTUAL VISIT (OUTPATIENT)
Dept: SLEEP MEDICINE | Facility: CLINIC | Age: 66
End: 2021-03-09
Payer: COMMERCIAL

## 2021-03-09 DIAGNOSIS — G47.33 OSA (OBSTRUCTIVE SLEEP APNEA): Primary | ICD-10-CM

## 2021-03-09 PROCEDURE — 99213 OFFICE O/P EST LOW 20 MIN: CPT | Mod: TEL | Performed by: PHYSICIAN ASSISTANT

## 2021-05-27 NOTE — TELEPHONE ENCOUNTER
Left message to inform patient Application for Disability Parking form has been completed and ready for  at the [facility] location. Form will be in bin above Endocrinology/Rheumatology desk.     This form needs to be hand carried by patient to [facility]. Information verified by [facility] office.

## 2021-05-28 NOTE — TELEPHONE ENCOUNTER
Patient dropped off Application for Disability Parking Certification to be completed. Put in mail slot at [facility] location.  Thank you

## 2021-05-28 NOTE — TELEPHONE ENCOUNTER
Pt was given a copy of the Disability Parking form that was filled out 4/3/19, the DMV will not accept copies, they need the original copy of the form. Pt dropped a new form for Dr Rose to complete and sign. Pt notified that Dr Rose is out until 5/7/19 and we will call him when forms are complete.

## 2021-05-29 ENCOUNTER — RECORDS - HEALTHEAST (OUTPATIENT)
Dept: ADMINISTRATIVE | Facility: CLINIC | Age: 66
End: 2021-05-29

## 2021-05-31 VITALS — BODY MASS INDEX: 32.14 KG/M2 | WEIGHT: 217 LBS | HEIGHT: 69 IN

## 2021-06-01 VITALS — HEIGHT: 69 IN | BODY MASS INDEX: 32.14 KG/M2 | WEIGHT: 217 LBS

## 2021-06-01 VITALS — WEIGHT: 217.6 LBS | BODY MASS INDEX: 32.23 KG/M2 | HEIGHT: 69 IN

## 2021-06-01 VITALS — WEIGHT: 215 LBS | HEIGHT: 69 IN | BODY MASS INDEX: 31.84 KG/M2

## 2021-06-02 VITALS — WEIGHT: 217.8 LBS | BODY MASS INDEX: 32.63 KG/M2

## 2021-06-15 NOTE — PROGRESS NOTES
"Quentin Bahena who presents today with a chief complaint of  Consult, joint pains      Joint Pains: Yes  Location:  \"all over\", mainly: knees, elbows, feet, low back  Onset: chronic 2012, worse past couple of yrs.  Intensity:  8-9/10  AM Stiffness: 30-60 Minutes  Alleviating/Aggravating Factors: Activity exacerbates pains.  Tolerating Meds: Yes  Other:      ROS:  Patient denies having any chest pain, shortness of breath, cough, abdominal pain, nausea, vomiting, rashes, fevers, oral ulcers and recent infections.  Patient admits to having a good appetite.  Denies having  alopecia,  photosensitivity.        Problem List:  Patient Active Problem List   Diagnosis     Arthralgia     Myalgia     MITRA positive     Knee osteoarthritis     Knee pain, bilateral        PMH:   No past medical history on file.    Surgical History:  No past surgical history on file.    Family History:  No family history on file.    Social History:   reports that he has quit smoking. He has never used smokeless tobacco. He reports that he drinks alcohol.    Allergies:  No Known Allergies     Current Medications:  Current Outpatient Prescriptions   Medication Sig Dispense Refill     busPIRone (BUSPAR) 5 MG tablet Take 10 mg by mouth 2 (two) times a day.       multivitamin with minerals (THERA-M) 9 mg iron-400 mcg Tab tablet Take 1 tablet by mouth daily.       omeprazole (PRILOSEC) 20 MG capsule Take by mouth.       pregabalin (LYRICA) 50 MG capsule Take 100 mg by mouth at bedtime.       glucosamine sulfate 500 mg cap Take 500 mg by mouth 3 (three) times a day.       omeprazole (PRILOSEC OTC) 20 MG tablet 1 cap(s)       No current facility-administered medications for this visit.            Physical Exam:  /80  Pulse 88  Resp 10  Ht 5' 8.5\" (1.74 m)  Wt 217 lb (98.4 kg)  BMI 32.51 kg/m2  General: A & O x 3 in NAD  HEENT: EOMI, Non injected/non icteric sclera, no oral lesions noted  CV: s1s2 with RRR, no rubs appreciated   Lungs: CTA B/L, no " wheezing , rales or rhonci appreciated  GI: Soft, NT/ND, no rebound, no guarding noted  MS: Mild limitation on active C-spine range of motion testing with mild discomfort, no radiating pain.  Passive flexion/extension of knees did not reproduce any pains, no synovitis noted.  Demonstrated 5/5 proximal/distal upper/lower extremity strength.  Some mild decrease in  strength bilaterally.  Greater than 11/18 myofascial tender points on exam.  Otherwise patient demonstrated good passive/active ROM over other joints with no warmth, erythema, tenderness or synovitis noted over these joints.      Assessment/Plan:    1. MITRA positive  ×2, lately had MITRA subsets which were unremarkable.  Of questionable clinical significance at this time.  We will obtain some additional labs to complete workup.    - Microalbumin, Random Urine  - Urinalysis  - Cardiolipin Antibody, IgA (CARDA)  - Cardiolipin Antibodies, IgG and IgM(CARDGM)  - Lupus Anticoagulant    2. Fibromyalgia  Meets criteria given chronic unrefreshing sleep, chronic fatigue, possible component of anxiety, greater than 11/18 myofascial tender points on exam.      Has tried Neurontin and Lyrica without much benefit.  Desires to wean off Lyrica, currently taking 1 tablet in the morning and 2 tablets in evening recommend he take 1 tablet twice a day for 2 weeks then 1 tablet daily for 2 weeks to off.    Has not tried Tylenol.  Suggested for arthralgias/myalgias he try taking 500-1000 mg twice daily.    Occasionally he will take ibuprofen 400 mg with some benefit therefore made aware that if insufficient relief with Tylenol he can add 400 mg of ibuprofen twice a day with food for breakthrough pain.    If still symptomatic a consideration is adding Flexeril.  Will hold off for now given pending EMG studies.    Recently started on BuSpar by his PCP for possible component of anxiety.    - Vitamin D, Total (25-Hydroxy)  - Calcium  - Albumin  - Magnesium    3. Osteoarthritis of  both knees, unspecified osteoarthritis type    X-rays showed signs of DJD.  Cortisone  injections performed a few years ago were helpful.    4. Chronic bilateral low back pain without sciatica    Denies having any radiating pain.  Will obtain x-rays.  Depending on x-ray and EMG results will consider obtaining MRI if deemed appropriate and still symptomatic.    - EMG- Both Legs; Future  - HLA-B27 Antigen  - XR Lumbar Spine 2 or 3 VWS; Future  - XR Sacroliac Joints 3 Or More VWS; Future    5. Paresthesia of bilateral legs    - Angiotensin Converting Enzyme,Serum(ACE)    6. Multiple joint pain  We will monitor response to Tylenol and ibuprofen.    Also noted to have negative/unremarkable:, CCP antibody, Lyme antibody, TSH, CK level, ESR, CRP, CBC, hep C antibody    - Uric Acid  - Angiotensin Converting Enzyme,Serum(ACE)    7. Chronic neck pain    - EMG- Both Arms; Future  - XR Cervical Spine 2 - 3 VWS; Future    8. Weakness of both arms    - EMG- Both Arms; Future    9. Weakness of both legs    - EMG- Both Legs; Future    Addendum: From February 19, 2018  Normal EMG/nerve conduction study    Spent 50 minutes with greater than half of this time spent with the patient going over differential diagnosis, prognosis, treatment plan, medication side effects and  answering questions.    The patient is new to me however, is a follow-up to our rheumatology clinic.      Patient accompanied by his son and daughter-in-law to this visit.    This note was transcribed using Dragon voice recognition software as a result unintentional grammatical errors or word substitutions may have occurred. Please contact our Rheumatology department if you need any clarification or if you have any related inquiries.    Side effect profile of medications suggested were discussed with the patient.      Frank Rose ....................  2/1/2018   2:56 PM

## 2021-06-16 NOTE — PROGRESS NOTES
Assessment/Plan:      Diagnoses and all orders for this visit:    Cervicalgia  -     MR Cervical Spine Without Contrast; Future; Expected date: 2/12/18    Myalgia  -     MR Cervical Spine Without Contrast; Future; Expected date: 2/12/18  -     meloxicam (MOBIC) 7.5 MG tablet; Take 1 tablet (7.5 mg total) by mouth daily.  Dispense: 30 tablet; Refill: 0    Arthralgia  -     meloxicam (MOBIC) 7.5 MG tablet; Take 1 tablet (7.5 mg total) by mouth daily.  Dispense: 30 tablet; Refill: 0    Memory change  -     MR Brain Without Contrast; Future; Expected date: 2/12/18    Spondylolisthesis, lumbar region        Assessment: Very pleasant 62-year-old gentlemanWith a history of anxiety with:    1.  Approximately 2 year history of progressing widespread myalgias and arthralgias throughout the cervical spine lumbar spine arms and legs with worsening memory.  Negative labs thus far occluding multiple rheumatologic labs, ESR, CRP, electrolytes, Lyme.  2.  Cervical spine pain with some minimal degenerative changes in the cervical spine.  3.  Lumbar spine pain with grade 1 spondylolisthesis L4 and L5 likely degenerative, cannot exclude pars defects.  With no radicular symptoms, unknown significance given widespread nature of his symptoms.  He does have also have transitional L5 segment.  4.  Poor memory  I discussed the diagnoses and treatment options with the patient and his son.  Had several questions today.  He has widespread pain has not found any answers for the past 2 years with      Discussion:    1.  Rheumatology and has had multiple lab work done all of which is negative with the exception of mild low vitamin D.  Has had elevated MITRA which they feel is potentially false positive or insignificant given all other labs are normal.  Despite this he has progression of poor memory, widespread pain.  He has been diagnosed with fibromyalgia which may be a factor here.  2.  Recommend MRI of the head and neck given his widespread pain  along with memory loss.  Differential includes start of NPH versus other intracranial abnormality.  3.  Trial meloxicam for pain.  4.  Continue with plan of EMG of the right arm and right leg ordered through rheumatology.  This has been scheduled for Friday and agree with perform that test.  5.  Cymbalta may be a good option for him.  I would recommend discussing transition to Cymbalta with his primary care provider.      6. We will follow-up after EMG testing to reevaluate and discuss options and will also discuss MRI findings if it has been completed at that time.      It was our pleasure caring for your patient today, if there any questions or concerns please do not hesitate to contact us.      Subjective:   Patient ID: Quentin Bahena is a 62 y.o. male.    History of Present Illness: Patient presents at the request of Dr. Bae for evaluation of widespread myalgias arthralgias neck pain low back pain evaluate imaging.  The patient presents with a son who provides some of the history and has several questions today as well.  Patient states his pain started approximately 2 years ago with no trauma.  He is a  and is doing a lot of heavy work and now has had some aches and pains but was an avid golfer and skier.  Was able to cough up to 140 rounds a year and skied quite frequently in the winter.  Over a two-year period he has developed severe pain throughout his neck low back arms and legs with myalgias and arthralgias.  Also had knee pain.  Has had progressive memory deficits as well.  All this is becoming worse.  Worse when he first gets up in the morning stiff for at least an hour and seems to be okay during the day but is still difficult for him to transition from sitting to standing and walking.  He does have some low back pain specifically with walking.  Better with laying down in general.  No radiation down the arms or legs of any numbness tingling.  His arms feel stiff in general.  In cold days  are both just as bad.    He has not had any physical therapy.  He does see a chiropractor for his neck pain occasionally which does help with that.  Has had numerous labs done and were reviewed including thyroid, CMP, MITRA, numerous rheumatologic labs.  The only labs that were remarkable include MITRA which was elevated and further labs and notes through rheumatology note that this was likely incidental.  Also has had mildly low vitamin D at 23 and is on supplementation.    His son does tell me that he will have several similar conversations with his father and his father will not remember having those conversations prior.  He is on BuSpar for anxiety which is not helpful.  Has been on Lyrica which also has not been helpful.  EMG has been ordered through rheumatology.    He has had cervical and lumbar x-rays which were personally reviewed.  Also has had sacrum and hip films.  Imaging report personally reviewed.  Cervical spine x-ray shows some flattening of the cervical lytic curve.  Some mild degenerative changes my review.  Visualization only to the C6 vertebral body.  Lumbar plain films show spondylolisthesis L4-L5 grade 1 cannot tell if this is related to pars defect or from a  Degenerative nature.  There is some facet arthropathy at that level.  Minimal retrolisthesis L3 and L4.  Transitional L5 vertebrae.  Hip films from 2015 normal/unremarkable.  Prominent degenerative spurring of the right SI joint.       Review of Systems: Denies red swollen joints, rashes, fevers chills.  Has poor sleep, shortness of breath, anxiety, decreased endurance, back pain joint pain leg pain, headaches. Remainder of 12 point review systems negative unless listed above.    Past Medical History:   Diagnosis Date     Anxiety        The following portions of the patient's history were reviewed and updated as appropriate: allergies, current medications, past family history, past medical history, past social history, past surgical history  and problem list.      WHO 5: 5    RAFAL Score: 60  NDI Score: 54      Objective:   Physical Exam:    Vitals:    02/12/18 1449   BP: 130/75   Pulse: 72       General:  Well-appearing male in no acute distress.  Pleasant,   cooperative, and interactive throughout the examination and interview.  CV: No lower extremity edema.  Lymphatics: No cervical lymphadenopathy palpated.  Eyes: sclera clear.  Skin: No rashes or lesions seen.  Respirations unlabored.  MSK: Gait is cautious.  Nonantalgic.  Mild increased sway on Romberg but no loss of balance.  Spine: normal AP curves of the C, T, and L spine.  Full range of motion in the lumbar spine and cervical spine.  Palpation: No significant tenderness over the paraspinals of the cervical thoracic or lumbar spine.  No significant tenderness over the arms or legs.  Extremities: Full range of motion of the shoulders in abduction, elbows, and wrists with no effusions or tenderness to palpation.  He does have prominent right AC joint likely prior  shoulder.  Negative arm drop, empty can, and Speed's test bilaterally. .  Full range of motion of the hips, knees, and ankles from a seated position with no effusions or tenderness to palpation.    Neurologic exam: Mental status: Patient is alert and oriented with normal affect.  Attention, knowledge,  , and language are intact.  May have slight memory deficit.  Normal coordination throughout the examination.  Reflexes are 2+ and symmetric biceps, triceps, brachioradialis, patellar, and Achilles with down-going toes and Negative Lee Ann's.  Sensation is intact to light touch throughout the upper and lower extremities bilaterally.  Manual muscle testing reveals 5 out of 5 strength in the shoulder abductors, elbow   flexors/extensors, wrist extensors, interosseous, and finger flexors; 5 out of 5   in the hip flexors, knee flexors/extensors, ankle plantar flexors, ankle   dorsiflexors, and EHL.  Normal muscle bulk and tone.   Negative   Spurling's test bilaterally.  Negative seated   straight leg raise bilaterally.

## 2021-06-16 NOTE — PROGRESS NOTES
Assessment/Plan:      Diagnoses and all orders for this visit:    Myalgia  -     Ambulatory referral to Physical Therapy    Memory change    Arthralgia  -     Ambulatory referral to Physical Therapy    Chronic neck pain  -     Ambulatory referral to Physical Therapy    Spondylolisthesis, lumbar region  -     Ambulatory referral to Physical Therapy        Assessment:Very pleasant 62-year-old gentlemanWith a history of anxiety with:     1.  Approximately 5 year gradual progressing widespread myalgias and arthralgias throughout the cervical spine lumbar spine arms and legs with worsening memory.  Negative labs thus far occluding multiple rheumatologic labs, ESR, CRP, electrolytes, Lyme.  This may represent fibromyalgia type picture.  2.  Cervical spine pain with some minimal degenerative changes in the cervical spine.  3.  Lumbar spine pain with grade 1 spondylolisthesis L4 and L5 likely degenerative, cannot exclude pars defects.  With no radicular symptoms, unknown significance given widespread nature of his symptoms.  He does have also have transitional L5 segment.  4.  Poor memory not as bothersome for him at this time.      Discussion:    1.  I discussed the MRI results with the patient.  There is some degenerative change of the cervical spine but no spinal cord compression.  There is also some age-related changes in the brain.  We discussed options of referrals also discussed fibromyalgia and his current medications.  2.  Trial physical therapy for cervical and lumbar strengthening stabilization overall exercise and conditioning.  3.  Trial turmeric over-the-counter as an anti-inflammatory.  4.  Reassured him that I am not finding anything abnormal on cervical MRI.  5.  Recommend he continue per rheumatology or PCP with Cymbalta 60 mg daily.  6.  I did offer neurology referral to discuss memory and potential neuropsychologic testing but he is not interested.  7.  I educated him on fibromyalgia including regular  exercise, activity pacing, regular sleep and healthy diet.  We also discussed low inflammatory diet.    8.  Follow-up 2 months    25 minutes were spent with this patient in addition to any procedure with greater than 50% in counseling and coordination of care.      It was our pleasure caring for your patient today, if there any questions or concerns please do not hesitate to contact us.      Subjective:   Patient ID: Quentin Bahena is a 62 y.o. male.    History of Present Illness: Patient presents with his son today for an evaluation of neck pain low back pain polymyalgias and arthralgias.  Also has some memory deficits.  Since last visit did travel to [place] did some skiing with no trouble.  Pain is not changed.  6/10 today 4/10 at best 8/10 at worst.  This is throughout the neck shoulders elbows knees wrists.  Has been started on Cymbalta and increased to 60 mg today taking this in the morning.  Also started on cyclobenzaprine by rheumatology.  Still having his issues.  Has had numerous labs drawn dating back to last fall including ESR CRP Lyme titer CBC rheumatology/autoimmune labs everything is been negative thus far.  Recently had MRI of the head and neck which was reviewed today.    He also has several questions regarding possible diagnosis of fibromyalgia.  He has had symptoms worsening for the past several years which is been a slow progression.  He is not too concerned about the memory today but is more concerned about the generalized weakness and arthralgias.      Imaging: MRI report and images were personally reviewed and discussed with the patient.  A plastic model was utilized during the discussion.  MRI of the cervical spine personally reviewed.  Some mild degenerative disc disease throughout the cervical spine with mild facet arthropathy but no high-grade central stenosis or foraminal stenosis.  There is some mild scattered foraminal stenosis C5-6 6-7 C7-T1.  There may be some thickening or  ossification of the posterior longitudinal ligament but no high-grade spinal cord compression.  There may be some congenital central stenosis but again no spinal cord impingement.    MRI brain shows some mild generalized cerebral and cerebellar atrophy with mild chronic small vessel ischemic changes no mass hemorrhage or hydrocephalus.    Review of Systems: Complains of generalized weakness and dizziness.  No numbness or tingling bowel or bladder incontinence headache nausea vomiting blurred vision or balance changes    Past Medical History:   Diagnosis Date     Anxiety        The following portions of the patient's history were reviewed and updated as appropriate: allergies, current medications, past family history, past medical history, past social history, past surgical history and problem list.      Objective:   Physical Exam:    Vitals:    03/15/18 1355   BP: 126/74   Pulse: 69       General: Alert and oriented with normal affect. Attention, knowledge,    and language are intact. No acute distress.  May have some memory deficits.  Eyes: Sclerae are clear.  Respirations: Unlabored. CV: No lower extremity edema.   Gait:  Nonantalgic    Sensation is intact to light touch throughout the upper and lower extremities.  Reflexes are 2+ and symmetric in the biceps triceps and brachioradialis with negative Hoffmans.      Manual muscle testing reveals:  Right /Left out of 5  5/5 shoulder abductors  5/5 elbow flexors  5/5 elbow extensors  5/5 wrist extensors  5/5 interosseus  5/5 finger flexors     5/5 ankle plantar flexors  5/5 ankle dorsiflexors  5/5 ankle evertors

## 2021-06-16 NOTE — PROGRESS NOTES
Patient presents for right upper and right lower extremity EMG.  He has ongoing constant whole body pain in the neck lumbar spine, arms and legs with weakness in his legs and arms.  He has bilateral hand pain.    EMG/NCS  results: Please see scanned full report    Comment NCS: Normal study  1.  Normal nerve conduction studies right upper and lower extremity.  2. Repetitive stimulation: 2 Hz repetitive stimulation of the right ulnar nerve recorded at the ADM revealed no decrement or increment at rest, post facilitation, or 1 minute post exercise.    Comment EMG: Normal study  1.  Normal needle EMG right upper and lower extremity.    Interpretation: Normal study    1. There is no electrodiagnostic evidence of cervical radiculopathy, brachial plexopathy, or focal neuropathy in the right upper extremity.  No electrodiagnostic evidence of lumbosacral radiculopathy, lumbosacral plexopathy or focal neuropathy in the right lower extremity.  No electrodiagnostic evidence of myopathy or neuromuscular junction defect.    Continue with current plan of care including MRI cervical spine and brain.  I will contact him with results when available.    The testing was completed in its entirety by the physician.       It was our pleasure caring for your patient today, if there any questions or concerns please do not hesitate to contact us.

## 2021-06-16 NOTE — PROGRESS NOTES
"Quentin Bahena who presents today with a chief complaint of  Follow-up (results - OA Knne, arthralgia, myalgia)      Joint Pains:  Yes  Location: elbows, knees, wrists, feet  Onset: chronic  Intensity:  7/10  AM Stiffness: 60 Minutes  Alleviating/Aggravating Factors: Cymbalta helping a bit.  Tolerating Meds: yes  Other:      ROS:  Patient denies having any chest pain, shortness of breath, cough, abdominal pain, nausea, vomiting, rashes, fevers, oral ulcers and recent infections.  Patient admits to having a good appetite      Problem List:  Patient Active Problem List   Diagnosis     Arthralgia     Myalgia     MITRA positive     Knee osteoarthritis     Knee pain, bilateral        PMH:   Past Medical History:   Diagnosis Date     Anxiety        Surgical History:  No past surgical history on file.    Family History:  Family History   Problem Relation Age of Onset     Cancer Mother      Cancer Father        Social History:   reports that he has quit smoking. He has never used smokeless tobacco. He reports that he drinks alcohol.    Allergies:  No Known Allergies     Current Medications:  Current Outpatient Prescriptions   Medication Sig Dispense Refill     acetaminophen (TYLENOL) 500 MG tablet Take 1,000 mg by mouth 2 (two) times a day.       ergocalciferol (VITAMIN D2) 50,000 unit capsule Take 1 capsule (50,000 Units total) by mouth once a week. After 12 weeks take over-the-counter 1000 units of vitamin D daily. 12 capsule 0     meloxicam (MOBIC) 7.5 MG tablet Take 1 tablet (7.5 mg total) by mouth daily. 30 tablet 0     omeprazole (PRILOSEC) 20 MG capsule Take by mouth.       triamcinolone (KENALOG) 0.1 % cream        No current facility-administered medications for this visit.            Physical Exam:  /70 (Patient Site: Right Arm, Patient Position: Sitting, Cuff Size: Adult Large)  Pulse 78  Ht 5' 8.5\" (1.74 m)  Wt 217 lb 9.6 oz (98.7 kg)  BMI 32.6 kg/m2  General: A & O x 3 in NAD  HEENT: EOMI, Non " injected/non icteric sclera, no oral lesions noted  CV: s1s2 with RRR, no rubs appreciated   Lungs: CTA B/L, no wheezing , rales or rhonci appreciated  GI: Soft, NT/ND, no rebound, no guarding noted  MS: Mild fullness noted involving left knee.  Both knees nontender to passive flexion/extension.  Mild leg length discrepancy with left lower extremity riding a bit higher than right.  Demonstrate 5/5 strength involving upper/lower proximal extremities.  Otherwise patient demonstrated good passive/active ROM over other joints with no warmth, erythema, tenderness or synovitis noted over these joints.      Summary/Assessment:    History that includes osteoarthritis, chronic neck and low back pains, fibromyalgia, paresthesias involving lower extremities.    Presents for follow-up visit and is feeling a bit better with taking Tylenol regularly and is sleeping better with Tylenol PM.      Cymbalta 30 mg also recently started by spine clinic, thinks helpful.    Appears to have mild left knee effusion.    Appears to have mild leg length discrepancy with left lower extremity riding a bit higher than right.    Has an appointment tomorrow with spine clinic to reassess and also review recent MRI of C-spine.    EMG study of upper and lower extremities unremarkable.    Noted to be vitamin D deficient, is currently taking replenishing dose.    Please see below for management plan.      Pertinent rheumatology/past medical history (please refer to above for more detailed history):      MITRA positive (subsets unremarkable)     Fibromyalgia    Chronic low back pain, nonradiating.    Chronic neck pain    Lower extremity paresthesias (EMG unremarkable    Subjective weakness (arms and legs, EMG unremarkable).    Osteoarthritis, knees    Chronic foot pains    Leg length discrepancy (left higher than right)    Vitamin D deficiency    Rheumatology medications provided/suggested:    Flexeril  Tylenol  Vitamin D    Pertinent medication from other  providers or from otc (please refer to above for more detailed med list):    Cymbalta  Ibuprofen (seldomly)      Pertinent medications already tried:     Lyrica  Neurontin        Pertinent lab history:      Positive MITRA.      Noted to have negative/unremarkable: Rheumatoid factor, CCP antibody, MITRA related subsets, Lyme antibody, uric acid, ACE level, magnesium, CRP, ESR, urinalysis, HLA-B 27      Pertinent imaging/test history:    MRI of cervical spine:  1.  Relatively mild disc degeneration in the cervical discs with only mild scattered facet arthropathy in the cervical facets.   2.  No high-grade central canal narrowing or intrinsic cord abnormality.   3.  At the C5-C6 level there is mild bilateral uncinate spurring with mild to moderate narrowing of both C5-C6 neural foramen slightly greater on the left than the right.  4.  At the C6-C7 level the central canal is adequate and there is mild left C6-C7 foraminal narrowing.  5.  At the C7-T1 level the central canal is adequate and there is mild to moderate right foraminal narrowing and minimal left C7-T1 foraminal narrowing.    EMG of upper/lower extremities were unremarkable.    Other:    Admits to having a few alcoholic beverages per week.    Plan:      We will add Flexeril to act as a muscle relaxant hopefully improve her sleep.    For arthralgias/myalgias continue Tylenol 500-1000 mg twice daily, prn.  Aware to reserve ibuprofen only for breakthrough pain and not to take regularly.    On Cymbalta, states prescribed by spine clinic.    Continue following through with directives provided by spine clinic.    We will refer to podiatry for chronic foot pains and apparent leg length discrepancy.    After completing replenishing dose of vitamin D recommended taking 1000 units of vitamin D over-the-counter daily.    Follow-up in 3-4 months.      Procedure note:       Spent 45 minutes with greater than half of this time spent with the patient going over differential  diagnosis, prognosis, treatment plan, medication side effects and answering questions.    Patient accompanied by his son to this visit.      This note was transcribed using Dragon voice recognition software as a result unintentional grammatical errors or word substitutions may have occurred. Please contact our Rheumatology department if you need any clarification or if you have any related inquiries.    Side effect profile of medications provided were discussed with the patient.      Frank Rose ....................  3/14/2018   4:04 PM

## 2021-06-19 NOTE — PROGRESS NOTES
Quentin Bahena who presents today with a chief complaint of  follow-up, joint pains.    Joint Pains: yes  Location: knee's, hips, elbows  Onset: chronic  Intensity:  8/10  AM Stiffness: 30 Minutes  Alleviating/Aggravating Factors:   Tolerating Meds: yes  Other:      ROS:  Patient denies having any chest pain, shortness of breath, cough, abdominal pain, nausea, vomiting, rashes, fevers, oral ulcers and recent infections.  Patient admits to having a good appetite      Problem List:  Patient Active Problem List   Diagnosis     Arthralgia     Myalgia     MITRA positive     Knee osteoarthritis     Knee pain, bilateral        PMH:   Past Medical History:   Diagnosis Date     Anxiety        Surgical History:  No past surgical history on file.    Family History:  Family History   Problem Relation Age of Onset     Cancer Mother      Cancer Father        Social History:   reports that he has quit smoking. He has never used smokeless tobacco. He reports that he drinks alcohol.    Allergies:  No Known Allergies     Current Medications:  Current Outpatient Prescriptions   Medication Sig Dispense Refill     acetaminophen (TYLENOL) 500 MG tablet Take 1,000 mg by mouth 2 (two) times a day.       cyclobenzaprine (FLEXERIL) 10 MG tablet Take 1 tab p.o. qhs (if feeling groggy the following morning, can try taking half a tablet instead or can take earlier the night before). 30 tablet 3     diclofenac sodium (VOLTAREN) 1 % Gel        DULoxetine (CYMBALTA) 30 MG capsule Take 60 mg by mouth daily.       ergocalciferol (VITAMIN D2) 50,000 unit capsule Take 1 capsule (50,000 Units total) by mouth once a week. After 12 weeks take over-the-counter 1000 units of vitamin D daily. 12 capsule 0     meloxicam (MOBIC) 7.5 MG tablet Take 1 tablet (7.5 mg total) by mouth daily. 30 tablet 0     omeprazole (PRILOSEC) 20 MG capsule Take by mouth.       triamcinolone (KENALOG) 0.1 % cream        TURMERIC ROOT EXTRACT ORAL Take by mouth.       No current  "facility-administered medications for this visit.            Physical Exam:  /80  Pulse 89  Resp 10  Ht 5' 8.5\" (1.74 m)  Wt 215 lb (97.5 kg)  SpO2 97%  BMI 32.22 kg/m2  General: A & O x 3 in NAD  HEENT: EOMI, Non injected/non icteric sclera, no oral lesions noted  CV: s1s2 with RRR, no rubs appreciated   Lungs: CTA B/L, no wheezing , rales or rhonci appreciated  GI: Soft, NT/ND, no rebound, no guarding noted  MS: Ambulates with a cane.  Mild hypertrophic changes noted involving hand joints.  Some mild limitation noted involving elbows on extension, no synovitis noted.  Positive discomfort involving shoulders and passive range of motion testing bilaterally, mild.  Passive flexion/extension of knees reproduce some mild discomfort, no clear signs of synovitis noted.  Otherwise patient demonstrated good passive/active ROM over other joints with no warmth, erythema, tenderness or synovitis noted over these joints.      Summary/Assessment:        History that includes osteoarthritis, chronic neck and low back pains, fibromyalgia, paresthesias involving lower extremities.    Has multiple joint pains, likely mechanical nature, likely degenerative joint disease and fibromyalgia contributing.    States Flexeril has not improved his sleep nor has he noticed any recent reduction of pain.  Adds to Tylenol PM work better for sleep and desires to restart.    Takes Tylenol for pain relief provides partial benefit.    On Cymbalta 30 mg started by spine clinic, thinks helpful.    Has seen podiatry states was told to have a bone spur which he plans on moving, per patient leg length discrepancy, not significant enough to treat at this time.    Completed vitamin D 12 weeks supplement dose and is now taking 1000 units of vitamin D daily.    Unremarkable autoimmune workup.    EMG study of upper and lower extremities unremarkable.    No clear signs of synovitis noted on exam today.    Please see below for management " plan.      Pertinent rheumatology/past medical history (please refer to above for more detailed history):      MITRA positive (subsets unremarkable)     Fibromyalgia    Osteoarthritis, multiple joints    Chronic low back pain, bilateral, nonradiating (has seen spine clinic)    Chronic neck pain    Lower extremity paresthesias (EMG unremarkable    Subjective weakness (arms and legs, EMG unremarkable).      Chronic foot pains (established with podiatry)    Vitamin D deficiency    Rheumatology medications provided/suggested:    Celebrex  Tylenol  Vitamin D    Pertinent medication from other providers or from otc (please refer to above for more detailed med list):    Cymbalta    Pertinent medications already tried:     Lyrica  Neurontin  Ibuprofen  Flexeril (ineffective)      Pertinent lab history:      Positive MITRA.      Noted to have negative/unremarkable: Rheumatoid factor, CCP antibody, MITRA related subsets, Lyme antibody, uric acid, ACE level, magnesium, CRP, ESR, urinalysis, HLA-B 27.      Pertinent imaging/test history:    MRI of cervical spine:  1.  Relatively mild disc degeneration in the cervical discs with only mild scattered facet arthropathy in the cervical facets.   2.  No high-grade central canal narrowing or intrinsic cord abnormality.   3.  At the C5-C6 level there is mild bilateral uncinate spurring with mild to moderate narrowing of both C5-C6 neural foramen slightly greater on the left than the right.  4.  At the C6-C7 level the central canal is adequate and there is mild left C6-C7 foraminal narrowing.  5.  At the C7-T1 level the central canal is adequate and there is mild to moderate right foraminal narrowing and minimal left C7-T1 foraminal narrowing.    EMG of upper/lower extremities were unremarkable.    Other:    Admits to having a few alcoholic beverages per week.    States now receiving disability.    Standing order for labs placed every 4 months good through June 2019.    Plan:      Discontinue  Flexeril.  Can restart Tylenol PM 1 tablet prior to bedtime.    For arthralgias/myalgias: Continue Tylenol 500-1000 mg twice daily, prn.      For breakthrough pain we will add Celebrex.  Made aware to avoid other anti-inflammatory meds.      On Cymbalta, states prescribed by spine clinic.    Continue taking vitamin D 1000 units daily.    Made aware that if knee pains persist or worsen he can contact us for viscous fluid/Euflexxa injections.    If low back pains persist or worsen, he can contact us for lumbar trigger tendon insertion site injections.    Check labs in 6 weeks, thereafter every 4 months.    Follow-up in 3-4 months.      Procedure note:       Spent 30 minutes with greater than half of this time spent with the patient going over differential diagnosis, prognosis, treatment plan, medication side effects and answering questions.      This note was transcribed using Dragon voice recognition software as a result unintentional grammatical errors or word substitutions may have occurred. Please contact our Rheumatology department if you need any clarification or if you have any related inquiries.      Major side effect profile of medications provided were discussed with the patient.      Frank Rose ....................  7/18/2018   1:28 PM

## 2021-06-21 NOTE — PROGRESS NOTES
Quentin Bahena who presents today with a chief complaint of  Follow-up, joint pains.    Joint Pains:  yes  Location: knee's, elbows  Onset: chronic  Intensity: 5 /10  AM Stiffness: 60 Minutes  Alleviating/Aggravating Factors: Celebrex prn helpful.  Tolerating Meds: yes  Other:      ROS:  Patient denies having any chest pain, shortness of breath, cough, abdominal pain, nausea, vomiting, rashes, fevers, oral ulcers and recent infections.  Patient admits to having a good appetite      Problem List:  Patient Active Problem List   Diagnosis     Arthralgia     Myalgia     MITRA positive     Knee osteoarthritis     Knee pain, bilateral        PMH:   Past Medical History:   Diagnosis Date     Anxiety        Surgical History:  History reviewed. No pertinent surgical history.    Family History:  Family History   Problem Relation Age of Onset     Cancer Mother      Cancer Father        Social History:   reports that he has quit smoking. He has never used smokeless tobacco. He reports that he drinks alcohol.    Allergies:  No Known Allergies     Current Medications:  Current Outpatient Prescriptions   Medication Sig Dispense Refill     acetaminophen (TYLENOL) 500 MG tablet Take 1,000 mg by mouth 2 (two) times a day.       celecoxib (CELEBREX) 200 MG capsule Take one tab po qd, prn. Take with food. 30 capsule 3     cholecalciferol, vitamin D3, (VITAMIN D3) 2,000 unit Tab Take 2,000 Units by mouth daily.       cyclobenzaprine (FLEXERIL) 10 MG tablet 10 mg at bedtime.   3     DULoxetine (CYMBALTA) 30 MG capsule Take 60 mg by mouth daily.       omeprazole (PRILOSEC) 20 MG capsule Take by mouth.       triamcinolone (KENALOG) 0.1 % cream        TURMERIC ROOT EXTRACT ORAL Take by mouth.       diclofenac sodium (VOLTAREN) 1 % Gel        No current facility-administered medications for this visit.            Physical Exam:  /70 (Patient Site: Right Arm, Patient Position: Sitting, Cuff Size: Adult Large)  Pulse 84  Wt 217 lb 12.8 oz  (98.8 kg) Comment: with boot  BMI 32.63 kg/m2  General: A & O x 3 in NAD  HEENT: EOMI, Non injected/non icteric sclera, no oral lesions noted  CV: s1s2 with RRR, no rubs appreciated   Lungs: CTA B/L, no wheezing , rales or rhonci appreciated  GI: Soft, NT/ND, no rebound, no guarding noted  MS: Ambulating with a cane.  Passive flexion/extension of knees did not reproduce any pains.  Passive range motion testing of elbows did not reproduce any pains, no synovitis noted.  Some mild discomfort involving left wrist on passive range of motion testing, no clear signs of synovitis noted.  Right distal lower extremity in a boot.  Otherwise patient demonstrated good passive/active ROM over other joints with no warmth, erythema, tenderness or synovitis noted over these joints.    Summary/Assessment:        History that includes osteoarthritis, chronic neck and low back pains, fibromyalgia, paresthesias involving lower extremities.    Presents for follow-up visit and although has chronic arthralgias/myalgias, symptoms have been more stable and controlled with current combination of meds.    Now back to taking Flexeril prior to bedtime as notices greater benefit than taking Tylenol PM.    Celebrex when taken helpful, takes as needed.  On average about once a week.    Recently had right toe fusion surgery, right lower extremity in a boot.  States anticipates being in this boot for another couple weeks, placed about 3 weeks ago.    Since being a bit deficient in vitamin D has been taking 2000 units vitamin D daily.    No clear signs of synovitis noted on exam today.    Please see below for management plan.    From prior note:  Unremarkable autoimmune workup.    EMG study of upper and lower extremities unremarkable.      Pertinent rheumatology/past medical history (please refer to above for more detailed history):      MITRA positive (subsets unremarkable)     Fibromyalgia    Osteoarthritis, multiple joints    Chronic low back pain,  bilateral, nonradiating (has seen spine clinic)    Chronic neck pain    Lower extremity paresthesias (EMG unremarkable    Subjective weakness (arms and legs, EMG unremarkable).      Chronic foot pains (established with podiatry)    Vitamin D deficiency    Rheumatology medications provided/suggested:    Celebrex as needed  Tylenol  Vitamin D    Pertinent medication from other providers or from otc (please refer to above for more detailed med list):    Cymbalta    Pertinent medications already tried:     Lyrica  Neurontin  Ibuprofen  Flexeril (ineffective)      Pertinent lab history:      Positive MITRA.      Noted to have negative/unremarkable: Rheumatoid factor, CCP antibody, MITRA related subsets, Lyme antibody, uric acid, ACE level, magnesium, CRP, ESR, urinalysis, HLA-B 27.      Pertinent imaging/test history:    MRI of cervical spine:  1.  Relatively mild disc degeneration in the cervical discs with only mild scattered facet arthropathy in the cervical facets.   2.  No high-grade central canal narrowing or intrinsic cord abnormality.   3.  At the C5-C6 level there is mild bilateral uncinate spurring with mild to moderate narrowing of both C5-C6 neural foramen slightly greater on the left than the right.  4.  At the C6-C7 level the central canal is adequate and there is mild left C6-C7 foraminal narrowing.  5.  At the C7-T1 level the central canal is adequate and there is mild to moderate right foraminal narrowing and minimal left C7-T1 foraminal narrowing.    X-rays of knees from March 2015 showed some signs of mild medial joint space narrowing left greater than right.    EMG of upper/lower extremities were unremarkable.    Other:    Admits to having a few alcoholic beverages per week.    States now receiving disability.    Standing order for labs placed every 4 months good through June 2019.    Plan:      We will re-prescribe Flexeril 5-10 mg nightly, as needed.    For arthralgias/myalgias: Continue Tylenol  500-1000 mg twice daily, prn.      For breakthrough continue Celebrex, as needed.     On Cymbalta, states prescribed by spine clinic.    Continue taking vitamin D 2000 units daily.    Desires to have parking disability form filled out.  Patient currently has right lower extremity in a boot and therefore cannot fill out until boot is removed (as cannot vouch that is able to currently drive).    Check labs prior to next visit.    Follow-up in 6 months.      Procedure note:       Spent 30 minutes with greater than half of this time spent with the patient going over differential diagnosis, prognosis, treatment plan, medication side effects and answering questions.      This note was transcribed using Dragon voice recognition software as a result unintentional grammatical errors or word substitutions may have occurred. Please contact our Rheumatology department if you need any clarification or if you have any related inquiries.      Major side effect profile of medications provided were discussed with the patient.        Frank Rose ....................  10/24/2018   2:51 PM

## 2021-06-22 NOTE — TELEPHONE ENCOUNTER
Patient is calling back about an update about this. Was told to call back after the holidays and the first of the year.    He can be reached @ [phone]

## 2021-06-23 NOTE — TELEPHONE ENCOUNTER
Patient called to check on status of the handicap sticker. He's been working on this request for 2 months now. Please advise.     Quentin @ [phone]

## 2021-06-23 NOTE — TELEPHONE ENCOUNTER
"Recommend providing for 12 months, for now.  Please first verify with patient that he is out of a boot, as discussed on previous visit (below).      \"Desires to have parking disability form filled out.  Patient currently has right lower extremity in a boot and therefore cannot fill out until boot is removed (as cannot vouch that is able to currently drive)\".        "

## 2021-06-23 NOTE — TELEPHONE ENCOUNTER
"Please advise on how long you would like to give pt the disability parking sticker for, diagnosis of \"osteoarthritis- risk of falling\" is what you had recommended per last conversation.   "

## 2021-06-23 NOTE — TELEPHONE ENCOUNTER
"Pt informed of Dr Rose's recommendations per my phone call with him 12/7/18  \"Per Dr Rose in phone conversation- he can complete Disability parking sticker, however he is out of clinic so he cannot sign it currently, Dr Rose said we could co-sign his name and staff's name however he is not sure if [employer] will accept this. If pt wants to wait until Dr Rose is back to prevent from having to do this twice, he can sign form when he is back in clinic.\"    Pt would like for us to complete the form and co-sign it with Dr Rose's name as he advised and pt understands the [employer] might not take the form without Dr Rose's personal signature. Pt would like us to mail completed form to him home address.  "

## 2021-06-24 NOTE — TELEPHONE ENCOUNTER
Patient is returning call.    Please call patient about his handicap sticker.      Quentin @ [phone]

## 2021-06-24 NOTE — TELEPHONE ENCOUNTER
Disability parking form completed per Dr Rose's comments/instructions below. Pt states he has not had a medical boot on his foot for several months now. Mailed completed form to pts home address.

## 2021-08-15 ENCOUNTER — HEALTH MAINTENANCE LETTER (OUTPATIENT)
Age: 66
End: 2021-08-15

## 2021-10-11 ENCOUNTER — HEALTH MAINTENANCE LETTER (OUTPATIENT)
Age: 66
End: 2021-10-11

## 2022-09-24 ENCOUNTER — HEALTH MAINTENANCE LETTER (OUTPATIENT)
Age: 67
End: 2022-09-24

## 2023-10-14 ENCOUNTER — HEALTH MAINTENANCE LETTER (OUTPATIENT)
Age: 68
End: 2023-10-14